# Patient Record
Sex: MALE | Race: BLACK OR AFRICAN AMERICAN | Employment: UNEMPLOYED | URBAN - METROPOLITAN AREA
[De-identification: names, ages, dates, MRNs, and addresses within clinical notes are randomized per-mention and may not be internally consistent; named-entity substitution may affect disease eponyms.]

---

## 2017-01-01 ENCOUNTER — APPOINTMENT (OUTPATIENT)
Dept: GENERAL RADIOLOGY | Age: 52
DRG: 871 | End: 2017-01-01
Attending: EMERGENCY MEDICINE
Payer: SELF-PAY

## 2017-01-01 ENCOUNTER — APPOINTMENT (OUTPATIENT)
Dept: CT IMAGING | Age: 52
DRG: 871 | End: 2017-01-01
Attending: EMERGENCY MEDICINE
Payer: SELF-PAY

## 2017-01-01 ENCOUNTER — APPOINTMENT (OUTPATIENT)
Dept: CT IMAGING | Age: 52
DRG: 871 | End: 2017-01-01
Attending: INTERNAL MEDICINE
Payer: SELF-PAY

## 2017-01-01 ENCOUNTER — APPOINTMENT (OUTPATIENT)
Dept: ULTRASOUND IMAGING | Age: 52
DRG: 871 | End: 2017-01-01
Attending: EMERGENCY MEDICINE
Payer: SELF-PAY

## 2017-01-01 ENCOUNTER — HOSPITAL ENCOUNTER (EMERGENCY)
Age: 52
Discharge: HOME | End: 2017-05-16
Attending: EMERGENCY MEDICINE
Payer: SELF-PAY

## 2017-01-01 ENCOUNTER — HOSPITAL ENCOUNTER (INPATIENT)
Age: 52
LOS: 1 days | DRG: 871 | End: 2017-12-01
Attending: EMERGENCY MEDICINE | Admitting: INTERNAL MEDICINE
Payer: SELF-PAY

## 2017-01-01 ENCOUNTER — APPOINTMENT (OUTPATIENT)
Dept: GENERAL RADIOLOGY | Age: 52
End: 2017-01-01
Attending: EMERGENCY MEDICINE
Payer: SELF-PAY

## 2017-01-01 VITALS
BODY MASS INDEX: 25.79 KG/M2 | HEIGHT: 70 IN | OXYGEN SATURATION: 98 % | RESPIRATION RATE: 25 BRPM | WEIGHT: 180.12 LBS | TEMPERATURE: 98.2 F | SYSTOLIC BLOOD PRESSURE: 181 MMHG | HEART RATE: 91 BPM | DIASTOLIC BLOOD PRESSURE: 126 MMHG

## 2017-01-01 VITALS
BODY MASS INDEX: 25.06 KG/M2 | OXYGEN SATURATION: 85 % | SYSTOLIC BLOOD PRESSURE: 71 MMHG | TEMPERATURE: 97.5 F | WEIGHT: 175.04 LBS | HEART RATE: 104 BPM | DIASTOLIC BLOOD PRESSURE: 60 MMHG | RESPIRATION RATE: 14 BRPM | HEIGHT: 70 IN

## 2017-01-01 DIAGNOSIS — I43 CARDIOMYOPATHY DUE TO HYPERTENSION, WITH HEART FAILURE (HCC): ICD-10-CM

## 2017-01-01 DIAGNOSIS — I46.9 CARDIAC ARREST (HCC): ICD-10-CM

## 2017-01-01 DIAGNOSIS — I11.0 CARDIOMYOPATHY DUE TO HYPERTENSION, WITH HEART FAILURE (HCC): ICD-10-CM

## 2017-01-01 DIAGNOSIS — I10 ESSENTIAL HYPERTENSION: ICD-10-CM

## 2017-01-01 DIAGNOSIS — J18.9 COMMUNITY ACQUIRED PNEUMONIA, UNSPECIFIED LATERALITY: ICD-10-CM

## 2017-01-01 DIAGNOSIS — J20.9 ACUTE BRONCHITIS, UNSPECIFIED ORGANISM: Primary | ICD-10-CM

## 2017-01-01 DIAGNOSIS — A41.9 SEPTIC SHOCK (HCC): Primary | ICD-10-CM

## 2017-01-01 DIAGNOSIS — R65.21 SEPTIC SHOCK (HCC): Primary | ICD-10-CM

## 2017-01-01 LAB
ALBUMIN SERPL BCP-MCNC: 3.7 G/DL (ref 3.5–5)
ALBUMIN SERPL-MCNC: 3.1 G/DL (ref 3.5–5)
ALBUMIN/GLOB SERPL: 0.8 {RATIO} (ref 1.1–2.2)
ALBUMIN/GLOB SERPL: 0.9 {RATIO} (ref 1.1–2.2)
ALP SERPL-CCNC: 131 U/L (ref 45–117)
ALP SERPL-CCNC: 78 U/L (ref 45–117)
ALT SERPL-CCNC: 27 U/L (ref 12–78)
ALT SERPL-CCNC: 347 U/L (ref 12–78)
ANION GAP BLD CALC-SCNC: 8 MMOL/L (ref 5–15)
ANION GAP SERPL CALC-SCNC: 10 MMOL/L (ref 5–15)
AST SERPL W P-5'-P-CCNC: 21 U/L (ref 15–37)
AST SERPL-CCNC: 213 U/L (ref 15–37)
ATRIAL RATE: 90 BPM
BASOPHILS # BLD AUTO: 0.1 K/UL (ref 0–0.1)
BASOPHILS # BLD: 0 % (ref 0–1)
BASOPHILS # BLD: 0 K/UL (ref 0–0.1)
BASOPHILS NFR BLD: 0 % (ref 0–1)
BILIRUB SERPL-MCNC: 1 MG/DL (ref 0.2–1)
BILIRUB SERPL-MCNC: 2.1 MG/DL (ref 0.2–1)
BNP SERPL-MCNC: 3252 PG/ML (ref 0–125)
BNP SERPL-MCNC: ABNORMAL PG/ML (ref 0–125)
BUN SERPL-MCNC: 17 MG/DL (ref 6–20)
BUN SERPL-MCNC: 22 MG/DL (ref 6–20)
BUN/CREAT SERPL: 12 (ref 12–20)
BUN/CREAT SERPL: 17 (ref 12–20)
CALCIUM SERPL-MCNC: 8.5 MG/DL (ref 8.5–10.1)
CALCIUM SERPL-MCNC: 8.7 MG/DL (ref 8.5–10.1)
CALCULATED P AXIS, ECG09: 58 DEGREES
CALCULATED R AXIS, ECG10: -7 DEGREES
CALCULATED T AXIS, ECG11: 151 DEGREES
CHLORIDE SERPL-SCNC: 104 MMOL/L (ref 97–108)
CHLORIDE SERPL-SCNC: 109 MMOL/L (ref 97–108)
CK MB CFR SERPL CALC: 2.1 % (ref 0–2.5)
CK MB SERPL-MCNC: 1.5 NG/ML (ref 5–25)
CK SERPL-CCNC: 105 U/L (ref 39–308)
CK SERPL-CCNC: 71 U/L (ref 39–308)
CO2 SERPL-SCNC: 21 MMOL/L (ref 21–32)
CO2 SERPL-SCNC: 24 MMOL/L (ref 21–32)
CREAT SERPL-MCNC: 1.28 MG/DL (ref 0.7–1.3)
CREAT SERPL-MCNC: 1.4 MG/DL (ref 0.7–1.3)
DIAGNOSIS, 93000: NORMAL
EOSINOPHIL # BLD: 0 K/UL (ref 0–0.4)
EOSINOPHIL # BLD: 0.3 K/UL (ref 0–0.4)
EOSINOPHIL NFR BLD: 0 % (ref 0–7)
EOSINOPHIL NFR BLD: 2 % (ref 0–7)
ERYTHROCYTE [DISTWIDTH] IN BLOOD BY AUTOMATED COUNT: 14.4 % (ref 11.5–14.5)
ERYTHROCYTE [DISTWIDTH] IN BLOOD BY AUTOMATED COUNT: 14.6 % (ref 11.5–14.5)
FLUAV AG NPH QL IA: NEGATIVE
FLUBV AG NOSE QL IA: NEGATIVE
GLOBULIN SER CALC-MCNC: 3.9 G/DL (ref 2–4)
GLOBULIN SER CALC-MCNC: 4 G/DL (ref 2–4)
GLUCOSE SERPL-MCNC: 107 MG/DL (ref 65–100)
GLUCOSE SERPL-MCNC: 145 MG/DL (ref 65–100)
HCT VFR BLD AUTO: 37.7 % (ref 36.6–50.3)
HCT VFR BLD AUTO: 42.5 % (ref 36.6–50.3)
HGB BLD-MCNC: 12.3 G/DL (ref 12.1–17)
HGB BLD-MCNC: 14.3 G/DL (ref 12.1–17)
LACTATE SERPL-SCNC: 2.6 MMOL/L (ref 0.4–2)
LACTATE SERPL-SCNC: 7.9 MMOL/L (ref 0.4–2)
LYMPHOCYTES # BLD AUTO: 18 % (ref 12–49)
LYMPHOCYTES # BLD: 1 K/UL (ref 0.8–3.5)
LYMPHOCYTES # BLD: 2 K/UL (ref 0.8–3.5)
LYMPHOCYTES NFR BLD: 6 % (ref 12–49)
MAGNESIUM SERPL-MCNC: 2.1 MG/DL (ref 1.6–2.4)
MAGNESIUM SERPL-MCNC: 2.4 MG/DL (ref 1.6–2.4)
MCH RBC QN AUTO: 25.7 PG (ref 26–34)
MCH RBC QN AUTO: 29 PG (ref 26–34)
MCHC RBC AUTO-ENTMCNC: 32.6 G/DL (ref 30–36.5)
MCHC RBC AUTO-ENTMCNC: 33.6 G/DL (ref 30–36.5)
MCV RBC AUTO: 78.7 FL (ref 80–99)
MCV RBC AUTO: 86.2 FL (ref 80–99)
MONOCYTES # BLD: 0.9 K/UL (ref 0–1)
MONOCYTES # BLD: 1.4 K/UL (ref 0–1)
MONOCYTES NFR BLD AUTO: 8 % (ref 5–13)
MONOCYTES NFR BLD: 8 % (ref 5–13)
NEUTS SEG # BLD: 15.8 K/UL (ref 1.8–8)
NEUTS SEG # BLD: 8.3 K/UL (ref 1.8–8)
NEUTS SEG NFR BLD AUTO: 72 % (ref 32–75)
NEUTS SEG NFR BLD: 86 % (ref 32–75)
P-R INTERVAL, ECG05: 174 MS
PLATELET # BLD AUTO: 489 K/UL (ref 150–400)
PLATELET # BLD AUTO: 569 K/UL (ref 150–400)
POTASSIUM SERPL-SCNC: 4 MMOL/L (ref 3.5–5.1)
POTASSIUM SERPL-SCNC: 4 MMOL/L (ref 3.5–5.1)
PROT SERPL-MCNC: 7.1 G/DL (ref 6.4–8.2)
PROT SERPL-MCNC: 7.6 G/DL (ref 6.4–8.2)
Q-T INTERVAL, ECG07: 460 MS
QRS DURATION, ECG06: 162 MS
QTC CALCULATION (BEZET), ECG08: 562 MS
RBC # BLD AUTO: 4.79 M/UL (ref 4.1–5.7)
RBC # BLD AUTO: 4.93 M/UL (ref 4.1–5.7)
SODIUM SERPL-SCNC: 135 MMOL/L (ref 136–145)
SODIUM SERPL-SCNC: 141 MMOL/L (ref 136–145)
TROPONIN I BLD-MCNC: <0.04 NG/ML (ref 0–0.08)
TROPONIN I SERPL-MCNC: 0.09 NG/ML
TROPONIN I SERPL-MCNC: <0.04 NG/ML
VENTRICULAR RATE, ECG03: 90 BPM
WBC # BLD AUTO: 11.5 K/UL (ref 4.1–11.1)
WBC # BLD AUTO: 18.3 K/UL (ref 4.1–11.1)

## 2017-01-01 PROCEDURE — 84484 ASSAY OF TROPONIN QUANT: CPT | Performed by: EMERGENCY MEDICINE

## 2017-01-01 PROCEDURE — 31500 INSERT EMERGENCY AIRWAY: CPT

## 2017-01-01 PROCEDURE — 85025 COMPLETE CBC W/AUTO DIFF WBC: CPT | Performed by: EMERGENCY MEDICINE

## 2017-01-01 PROCEDURE — 83880 ASSAY OF NATRIURETIC PEPTIDE: CPT | Performed by: EMERGENCY MEDICINE

## 2017-01-01 PROCEDURE — 87449 NOS EACH ORGANISM AG IA: CPT | Performed by: INTERNAL MEDICINE

## 2017-01-01 PROCEDURE — 83605 ASSAY OF LACTIC ACID: CPT | Performed by: EMERGENCY MEDICINE

## 2017-01-01 PROCEDURE — 5A1935Z RESPIRATORY VENTILATION, LESS THAN 24 CONSECUTIVE HOURS: ICD-10-PCS | Performed by: EMERGENCY MEDICINE

## 2017-01-01 PROCEDURE — 80053 COMPREHEN METABOLIC PANEL: CPT | Performed by: EMERGENCY MEDICINE

## 2017-01-01 PROCEDURE — 87804 INFLUENZA ASSAY W/OPTIC: CPT | Performed by: EMERGENCY MEDICINE

## 2017-01-01 PROCEDURE — 0BH17EZ INSERTION OF ENDOTRACHEAL AIRWAY INTO TRACHEA, VIA NATURAL OR ARTIFICIAL OPENING: ICD-10-PCS | Performed by: EMERGENCY MEDICINE

## 2017-01-01 PROCEDURE — 82803 BLOOD GASES ANY COMBINATION: CPT | Performed by: EMERGENCY MEDICINE

## 2017-01-01 PROCEDURE — 5A12012 PERFORMANCE OF CARDIAC OUTPUT, SINGLE, MANUAL: ICD-10-PCS | Performed by: EMERGENCY MEDICINE

## 2017-01-01 PROCEDURE — 74011250636 HC RX REV CODE- 250/636: Performed by: EMERGENCY MEDICINE

## 2017-01-01 PROCEDURE — 36600 WITHDRAWAL OF ARTERIAL BLOOD: CPT

## 2017-01-01 PROCEDURE — 36415 COLL VENOUS BLD VENIPUNCTURE: CPT | Performed by: EMERGENCY MEDICINE

## 2017-01-01 PROCEDURE — 82550 ASSAY OF CK (CPK): CPT | Performed by: EMERGENCY MEDICINE

## 2017-01-01 PROCEDURE — 96365 THER/PROPH/DIAG IV INF INIT: CPT

## 2017-01-01 PROCEDURE — 71020 XR CHEST PA LAT: CPT

## 2017-01-01 PROCEDURE — 74177 CT ABD & PELVIS W/CONTRAST: CPT

## 2017-01-01 PROCEDURE — 96372 THER/PROPH/DIAG INJ SC/IM: CPT

## 2017-01-01 PROCEDURE — 93005 ELECTROCARDIOGRAM TRACING: CPT

## 2017-01-01 PROCEDURE — 87899 AGENT NOS ASSAY W/OPTIC: CPT | Performed by: INTERNAL MEDICINE

## 2017-01-01 PROCEDURE — 83735 ASSAY OF MAGNESIUM: CPT | Performed by: EMERGENCY MEDICINE

## 2017-01-01 PROCEDURE — 74011000250 HC RX REV CODE- 250: Performed by: EMERGENCY MEDICINE

## 2017-01-01 PROCEDURE — 77030022643 HC PAD DEFIB AD HRTSTRT PHL -B

## 2017-01-01 PROCEDURE — 06HY33Z INSERTION OF INFUSION DEVICE INTO LOWER VEIN, PERCUTANEOUS APPROACH: ICD-10-PCS | Performed by: EMERGENCY MEDICINE

## 2017-01-01 PROCEDURE — 93306 TTE W/DOPPLER COMPLETE: CPT

## 2017-01-01 PROCEDURE — 87040 BLOOD CULTURE FOR BACTERIA: CPT | Performed by: EMERGENCY MEDICINE

## 2017-01-01 PROCEDURE — 77030008683 HC TU ET CUF COVD -A

## 2017-01-01 PROCEDURE — 99285 EMERGENCY DEPT VISIT HI MDM: CPT

## 2017-01-01 PROCEDURE — 74011000258 HC RX REV CODE- 258: Performed by: EMERGENCY MEDICINE

## 2017-01-01 PROCEDURE — 77030010896 HC CIRC VENT TRNSPT TRAN -B

## 2017-01-01 PROCEDURE — 96367 TX/PROPH/DG ADDL SEQ IV INF: CPT

## 2017-01-01 PROCEDURE — 75810000137 HC PLCMT CENT VENOUS CATH

## 2017-01-01 PROCEDURE — 65610000006 HC RM INTENSIVE CARE

## 2017-01-01 PROCEDURE — 71010 XR CHEST PORT: CPT

## 2017-01-01 PROCEDURE — 74011250637 HC RX REV CODE- 250/637: Performed by: EMERGENCY MEDICINE

## 2017-01-01 PROCEDURE — 94002 VENT MGMT INPAT INIT DAY: CPT

## 2017-01-01 PROCEDURE — C1751 CATH, INF, PER/CENT/MIDLINE: HCPCS

## 2017-01-01 PROCEDURE — 92950 HEART/LUNG RESUSCITATION CPR: CPT

## 2017-01-01 PROCEDURE — 74011636320 HC RX REV CODE- 636/320: Performed by: EMERGENCY MEDICINE

## 2017-01-01 RX ORDER — SODIUM BICARBONATE 1 MEQ/ML
SYRINGE (ML) INTRAVENOUS
Status: DISCONTINUED | OUTPATIENT
Start: 2017-01-01 | End: 2017-12-02 | Stop reason: HOSPADM

## 2017-01-01 RX ORDER — LISINOPRIL 10 MG/1
10 TABLET ORAL DAILY
Qty: 30 TAB | Refills: 1 | Status: SHIPPED | OUTPATIENT
Start: 2017-01-01 | End: 2017-01-01

## 2017-01-01 RX ORDER — ENOXAPARIN SODIUM 100 MG/ML
1 INJECTION SUBCUTANEOUS
Status: COMPLETED | OUTPATIENT
Start: 2017-01-01 | End: 2017-01-01

## 2017-01-01 RX ORDER — ROCURONIUM BROMIDE 10 MG/ML
100 INJECTION, SOLUTION INTRAVENOUS
Status: COMPLETED | OUTPATIENT
Start: 2017-01-01 | End: 2017-01-01

## 2017-01-01 RX ORDER — EPINEPHRINE 0.1 MG/ML
INJECTION INTRACARDIAC; INTRAVENOUS
Status: DISCONTINUED | OUTPATIENT
Start: 2017-01-01 | End: 2017-12-02 | Stop reason: HOSPADM

## 2017-01-01 RX ORDER — NOREPINEPHRINE BITARTRATE 1 MG/ML
INJECTION, SOLUTION INTRAVENOUS
Status: DISCONTINUED
Start: 2017-01-01 | End: 2017-12-02 | Stop reason: HOSPADM

## 2017-01-01 RX ORDER — ASPIRIN 325 MG
325 TABLET ORAL DAILY
COMMUNITY

## 2017-01-01 RX ORDER — PROPOFOL 10 MG/ML
0-50 VIAL (ML) INTRAVENOUS
Status: DISCONTINUED | OUTPATIENT
Start: 2017-01-01 | End: 2017-12-02 | Stop reason: HOSPADM

## 2017-01-01 RX ORDER — SODIUM CHLORIDE 9 MG/ML
50 INJECTION, SOLUTION INTRAVENOUS
Status: COMPLETED | OUTPATIENT
Start: 2017-01-01 | End: 2017-01-01

## 2017-01-01 RX ORDER — SODIUM CHLORIDE 0.9 % (FLUSH) 0.9 %
5-10 SYRINGE (ML) INJECTION EVERY 8 HOURS
Status: CANCELLED | OUTPATIENT
Start: 2017-01-01

## 2017-01-01 RX ORDER — AZITHROMYCIN 250 MG/1
TABLET, FILM COATED ORAL
Qty: 6 TAB | Refills: 0 | Status: SHIPPED | OUTPATIENT
Start: 2017-01-01 | End: 2017-01-01

## 2017-01-01 RX ORDER — SODIUM CHLORIDE 0.9 % (FLUSH) 0.9 %
10 SYRINGE (ML) INJECTION
Status: COMPLETED | OUTPATIENT
Start: 2017-01-01 | End: 2017-01-01

## 2017-01-01 RX ORDER — PROPOFOL 10 MG/ML
INJECTION, EMULSION INTRAVENOUS
Status: DISCONTINUED
Start: 2017-01-01 | End: 2017-12-02 | Stop reason: HOSPADM

## 2017-01-01 RX ORDER — SODIUM CHLORIDE 9 MG/ML
130 INJECTION, SOLUTION INTRAVENOUS CONTINUOUS
Status: DISCONTINUED | OUTPATIENT
Start: 2017-01-01 | End: 2017-12-02 | Stop reason: HOSPADM

## 2017-01-01 RX ORDER — SODIUM CHLORIDE 0.9 % (FLUSH) 0.9 %
5-10 SYRINGE (ML) INJECTION AS NEEDED
Status: CANCELLED | OUTPATIENT
Start: 2017-01-01

## 2017-01-01 RX ORDER — ENOXAPARIN SODIUM 100 MG/ML
40 INJECTION SUBCUTANEOUS EVERY 24 HOURS
Status: CANCELLED | OUTPATIENT
Start: 2017-01-01

## 2017-01-01 RX ORDER — HYDRALAZINE HYDROCHLORIDE 20 MG/ML
10 INJECTION INTRAMUSCULAR; INTRAVENOUS
Status: DISCONTINUED | OUTPATIENT
Start: 2017-01-01 | End: 2017-01-01 | Stop reason: ALTCHOICE

## 2017-01-01 RX ORDER — ETOMIDATE 2 MG/ML
20 INJECTION INTRAVENOUS
Status: COMPLETED | OUTPATIENT
Start: 2017-01-01 | End: 2017-01-01

## 2017-01-01 RX ORDER — SODIUM CHLORIDE 9 MG/ML
50 INJECTION, SOLUTION INTRAVENOUS
Status: DISCONTINUED | OUTPATIENT
Start: 2017-01-01 | End: 2017-12-02 | Stop reason: HOSPADM

## 2017-01-01 RX ORDER — HYDROCHLOROTHIAZIDE 25 MG/1
25 TABLET ORAL DAILY
Qty: 30 TAB | Refills: 1 | Status: SHIPPED | OUTPATIENT
Start: 2017-01-01 | End: 2017-01-01

## 2017-01-01 RX ORDER — LISINOPRIL 5 MG/1
5 TABLET ORAL DAILY
Status: DISCONTINUED | OUTPATIENT
Start: 2017-12-02 | End: 2017-12-02 | Stop reason: HOSPADM

## 2017-01-01 RX ORDER — DOPAMINE HYDROCHLORIDE 320 MG/100ML
INJECTION, SOLUTION INTRAVENOUS
Status: COMPLETED | OUTPATIENT
Start: 2017-01-01 | End: 2017-01-01

## 2017-01-01 RX ORDER — SODIUM BICARBONATE 1 MEQ/ML
50 SYRINGE (ML) INTRAVENOUS
Status: COMPLETED | OUTPATIENT
Start: 2017-01-01 | End: 2017-01-01

## 2017-01-01 RX ORDER — SODIUM CHLORIDE 0.9 % (FLUSH) 0.9 %
5-10 SYRINGE (ML) INJECTION AS NEEDED
Status: DISCONTINUED | OUTPATIENT
Start: 2017-01-01 | End: 2017-12-02 | Stop reason: HOSPADM

## 2017-01-01 RX ORDER — METOPROLOL TARTRATE 5 MG/5ML
5 INJECTION INTRAVENOUS
Status: COMPLETED | OUTPATIENT
Start: 2017-01-01 | End: 2017-01-01

## 2017-01-01 RX ORDER — EPINEPHRINE 0.1 MG/ML
INJECTION INTRACARDIAC; INTRAVENOUS
Status: COMPLETED | OUTPATIENT
Start: 2017-01-01 | End: 2017-01-01

## 2017-01-01 RX ORDER — SODIUM CHLORIDE 0.9 % (FLUSH) 0.9 %
10 SYRINGE (ML) INJECTION
Status: DISCONTINUED | OUTPATIENT
Start: 2017-01-01 | End: 2017-12-02 | Stop reason: HOSPADM

## 2017-01-01 RX ORDER — PREDNISONE 20 MG/1
40 TABLET ORAL DAILY
Qty: 10 TAB | Refills: 0 | Status: SHIPPED | OUTPATIENT
Start: 2017-01-01 | End: 2017-01-01

## 2017-01-01 RX ORDER — SODIUM BICARBONATE 1 MEQ/ML
SYRINGE (ML) INTRAVENOUS
Status: COMPLETED | OUTPATIENT
Start: 2017-01-01 | End: 2017-01-01

## 2017-01-01 RX ADMIN — ETOMIDATE 20 MG: 2 INJECTION, SOLUTION INTRAVENOUS at 19:58

## 2017-01-01 RX ADMIN — SODIUM CHLORIDE 50 ML/HR: 900 INJECTION, SOLUTION INTRAVENOUS at 19:26

## 2017-01-01 RX ADMIN — DOPAMINE HYDROCHLORIDE 5 MCG/KG/MIN: 320 INJECTION, SOLUTION INTRAVENOUS at 21:14

## 2017-01-01 RX ADMIN — AZITHROMYCIN MONOHYDRATE 500 MG: 500 INJECTION, POWDER, LYOPHILIZED, FOR SOLUTION INTRAVENOUS at 18:09

## 2017-01-01 RX ADMIN — EPINEPHRINE 1 MG: 0.1 INJECTION, SOLUTION ENDOTRACHEAL; INTRACARDIAC; INTRAVENOUS at 21:20

## 2017-01-01 RX ADMIN — ENOXAPARIN SODIUM 80 MG: 80 INJECTION SUBCUTANEOUS at 20:46

## 2017-01-01 RX ADMIN — EPINEPHRINE 1 MG: 0.1 INJECTION, SOLUTION ENDOTRACHEAL; INTRACARDIAC; INTRAVENOUS at 21:13

## 2017-01-01 RX ADMIN — Medication 10 ML: at 19:26

## 2017-01-01 RX ADMIN — EPINEPHRINE 1 MG: 0.1 INJECTION, SOLUTION ENDOTRACHEAL; INTRACARDIAC; INTRAVENOUS at 21:16

## 2017-01-01 RX ADMIN — SODIUM BICARBONATE 50 MEQ: 84 INJECTION, SOLUTION INTRAVENOUS at 20:28

## 2017-01-01 RX ADMIN — SODIUM BICARBONATE 50 MEQ: 84 INJECTION, SOLUTION INTRAVENOUS at 21:19

## 2017-01-01 RX ADMIN — NOREPINEPHRINE BITARTRATE 16 MCG/MIN: 1 INJECTION INTRAVENOUS at 20:53

## 2017-01-01 RX ADMIN — EPINEPHRINE 1 MG: 0.1 INJECTION, SOLUTION ENDOTRACHEAL; INTRACARDIAC; INTRAVENOUS at 21:23

## 2017-01-01 RX ADMIN — NOREPINEPHRINE BITARTRATE 4 MCG/MIN: 1 INJECTION INTRAVENOUS at 20:18

## 2017-01-01 RX ADMIN — IOPAMIDOL 100 ML: 755 INJECTION, SOLUTION INTRAVENOUS at 19:26

## 2017-01-01 RX ADMIN — CEFTRIAXONE SODIUM 2 G: 2 INJECTION, POWDER, FOR SOLUTION INTRAMUSCULAR; INTRAVENOUS at 16:41

## 2017-01-01 RX ADMIN — EPINEPHRINE 1 MG: 0.1 INJECTION, SOLUTION ENDOTRACHEAL; INTRACARDIAC; INTRAVENOUS at 21:06

## 2017-01-01 RX ADMIN — EPINEPHRINE 1 MG: 0.1 INJECTION, SOLUTION ENDOTRACHEAL; INTRACARDIAC; INTRAVENOUS at 21:09

## 2017-01-01 RX ADMIN — METOPROLOL TARTRATE 5 MG: 5 INJECTION INTRAVENOUS at 18:13

## 2017-01-01 RX ADMIN — SODIUM CHLORIDE 1000 ML: 900 INJECTION, SOLUTION INTRAVENOUS at 18:29

## 2017-01-01 RX ADMIN — NITROGLYCERIN 1 INCH: 20 OINTMENT TOPICAL at 16:39

## 2017-01-01 RX ADMIN — EPINEPHRINE 1 MG: 0.1 INJECTION, SOLUTION ENDOTRACHEAL; INTRACARDIAC; INTRAVENOUS at 21:27

## 2017-01-01 RX ADMIN — ROCURONIUM BROMIDE 100 MG: 10 INJECTION INTRAVENOUS at 19:59

## 2017-01-01 RX ADMIN — SODIUM BICARBONATE 50 MEQ: 84 INJECTION, SOLUTION INTRAVENOUS at 21:07

## 2017-05-16 NOTE — ED PROVIDER NOTES
HPI Comments: Senia Peterson is a 45 y.o. male with PMhx significant for HTN, asthma, and CVA who presents ambulatory to the ED as referred by [facility] regarding complaint of intermittent SOB x 2-3 weeks with an abnormal EKG at their office. Pt states symptoms are typically worse at night when laying flat. He reports use of  mg this morning. He denies any known hx of having EKG's which showed a LBBB. He specifically denies any CP or N/V/D. Social Hx: - Tobacco, + EtOH (occasionally), - Illicit Drugs    PCP: Bernardino Doyle MD    There are no other complaints, changes or physical findings at this time. The history is provided by the patient. No  was used. Past Medical History:   Diagnosis Date    Asthma     Hypertension     Stroke ([facility] [address]     in 2012       History reviewed. No pertinent surgical history. History reviewed. No pertinent family history. Social History     Social History    Marital status: SINGLE     Spouse name: N/A    Number of children: N/A    Years of education: N/A     Occupational History    Not on file. Social History Main Topics    Smoking status: Current Some Day Smoker     Packs/day: 0.25    Smokeless tobacco: Never Used    Alcohol use 2.4 oz/week     4 Cans of beer per week      Comment: occassional    Drug use: No    Sexual activity: Not on file     Other Topics Concern    Not on file     Social History Narrative         ALLERGIES: Review of patient's allergies indicates no known allergies. Review of Systems   Constitutional: Negative. Negative for appetite change, chills, fatigue and fever. HENT: Negative. Negative for congestion, rhinorrhea, sinus pressure and sore throat. Eyes: Negative. Respiratory: Positive for shortness of breath (orthopnea). Negative for cough, choking, chest tightness and wheezing. Cardiovascular: Negative. Negative for chest pain, palpitations and leg swelling. Gastrointestinal: Negative for abdominal pain, constipation, diarrhea, nausea and vomiting. Endocrine: Negative. Genitourinary: Negative. Negative for difficulty urinating, dysuria, flank pain and urgency. Musculoskeletal: Negative. Skin: Negative. Neurological: Negative. Negative for dizziness, speech difficulty, weakness, light-headedness, numbness and headaches. Psychiatric/Behavioral: Negative. All other systems reviewed and are negative. Patient Vitals for the past 12 hrs:   Temp Pulse Resp BP SpO2   05/16/17 1905 - 91 25 (!) 181/126 98 %   05/16/17 1900 - 95 23 (!) 189/122 98 %   05/16/17 1845 - 90 21 (!) 175/113 99 %   05/16/17 1830 - 90 25 (!) 177/117 98 %   05/16/17 1815 - 92 25 (!) 188/131 98 %   05/16/17 1808 - 93 23 (!) 181/111 98 %   05/16/17 1805 - 88 23 (!) 178/121 98 %   05/16/17 1750 - - - (!) 185/114 -   05/16/17 1700 - 90 22 (!) 155/109 99 %   05/16/17 1655 - - - (!) 171/113 -   05/16/17 1644 98.2 °F (36.8 °C) 88 20 (!) 174/126 97 %            Physical Exam   Constitutional: He is oriented to person, place, and time. He appears well-developed and well-nourished. No distress. HENT:   Head: Normocephalic and atraumatic. Mouth/Throat: Oropharynx is clear and moist. No oropharyngeal exudate. Eyes: Conjunctivae and EOM are normal. Pupils are equal, round, and reactive to light. Neck: Normal range of motion. Neck supple. No JVD present. No tracheal deviation present. Cardiovascular: Normal rate, regular rhythm, normal heart sounds and intact distal pulses. No murmur heard. Pulmonary/Chest: Effort normal. No stridor. No respiratory distress. He has no wheezes. He has no rales. He exhibits no tenderness. Coarse breath sounds left base   Abdominal: Soft. He exhibits no distension. There is no tenderness. There is no rebound and no guarding. Musculoskeletal: Normal range of motion. He exhibits no edema or tenderness.    Neurological: He is alert and oriented to person, place, and time. No cranial nerve deficit. No gross motor or sensory deficits    Skin: Skin is warm and dry. He is not diaphoretic. Psychiatric: He has a normal mood and affect. His behavior is normal.   Nursing note and vitals reviewed. MDM  Number of Diagnoses or Management Options  Acute bronchitis, unspecified organism:   Essential hypertension:   Diagnosis management comments: DDx: pneumonia, CHF, ACS, electrolyte abnormality       Amount and/or Complexity of Data Reviewed  Clinical lab tests: ordered and reviewed  Tests in the radiology section of CPT®: ordered and reviewed  Tests in the medicine section of CPT®: ordered and reviewed  Review and summarize past medical records: yes  Independent visualization of images, tracings, or specimens: yes    Patient Progress  Patient progress: stable    ED Course       Procedures    EKG- NSR, LBBB, rate 90, normal pr, prolonged qrs, ST-T wave changes c/w LBBB, no acute changes, Hugo Mcallister, DO    PROGRESS NOTE:  6:36 PM  Per RN, pt reports a hx of HTN but states he has not taken medication for HTN x 5 years after he ran out of the medication. Pt's BP continues to be elevated while in the ED. He was previously on Lisinopril 10 mg.      LABORATORY TESTS:  Recent Results (from the past 12 hour(s))   EKG, 12 LEAD, INITIAL    Collection Time: 05/16/17  4:49 PM   Result Value Ref Range    Ventricular Rate 90 BPM    Atrial Rate 90 BPM    P-R Interval 174 ms    QRS Duration 162 ms    Q-T Interval 460 ms    QTC Calculation (Bezet) 562 ms    Calculated P Axis 58 degrees    Calculated R Axis -7 degrees    Calculated T Axis 151 degrees    Diagnosis       Normal sinus rhythm  Possible Left atrial enlargement  Left bundle branch block  No previous ECGs available     CBC WITH AUTOMATED DIFF    Collection Time: 05/16/17  5:01 PM   Result Value Ref Range    WBC 11.5 (H) 4.1 - 11.1 K/uL    RBC 4.93 4.10 - 5.70 M/uL    HGB 14.3 12.1 - 17.0 g/dL    HCT 42.5 36.6 - 50.3 %    MCV 86.2 80.0 - 99.0 FL    MCH 29.0 26.0 - 34.0 PG    MCHC 33.6 30.0 - 36.5 g/dL    RDW 14.4 11.5 - 14.5 %    PLATELET 190 (H) 645 - 400 K/uL    NEUTROPHILS 72 32 - 75 %    LYMPHOCYTES 18 12 - 49 %    MONOCYTES 8 5 - 13 %    EOSINOPHILS 2 0 - 7 %    BASOPHILS 0 0 - 1 %    ABS. NEUTROPHILS 8.3 (H) 1.8 - 8.0 K/UL    ABS. LYMPHOCYTES 2.0 0.8 - 3.5 K/UL    ABS. MONOCYTES 0.9 0.0 - 1.0 K/UL    ABS. EOSINOPHILS 0.3 0.0 - 0.4 K/UL    ABS. BASOPHILS 0.1 0.0 - 0.1 K/UL   METABOLIC PANEL, COMPREHENSIVE    Collection Time: 05/16/17  5:01 PM   Result Value Ref Range    Sodium 141 136 - 145 mmol/L    Potassium 4.0 3.5 - 5.1 mmol/L    Chloride 109 (H) 97 - 108 mmol/L    CO2 24 21 - 32 mmol/L    Anion gap 8 5 - 15 mmol/L    Glucose 107 (H) 65 - 100 mg/dL    BUN 17 6 - 20 MG/DL    Creatinine 1.40 (H) 0.70 - 1.30 MG/DL    BUN/Creatinine ratio 12 12 - 20      GFR est AA >60 >60 ml/min/1.73m2    GFR est non-AA 53 (L) >60 ml/min/1.73m2    Calcium 8.7 8.5 - 10.1 MG/DL    Bilirubin, total 1.0 0.2 - 1.0 MG/DL    ALT (SGPT) 27 12 - 78 U/L    AST (SGOT) 21 15 - 37 U/L    Alk.  phosphatase 78 45 - 117 U/L    Protein, total 7.6 6.4 - 8.2 g/dL    Albumin 3.7 3.5 - 5.0 g/dL    Globulin 3.9 2.0 - 4.0 g/dL    A-G Ratio 0.9 (L) 1.1 - 2.2     CK W/ REFLX CKMB    Collection Time: 05/16/17  5:01 PM   Result Value Ref Range     39 - 308 U/L   TROPONIN I    Collection Time: 05/16/17  5:01 PM   Result Value Ref Range    Troponin-I, Qt. <0.04 <0.05 ng/mL   MAGNESIUM    Collection Time: 05/16/17  5:01 PM   Result Value Ref Range    Magnesium 2.4 1.6 - 2.4 mg/dL   PRO-BNP    Collection Time: 05/16/17  5:01 PM   Result Value Ref Range    NT pro-BNP 3252 (H) 0 - 125 PG/ML   POC TROPONIN-I    Collection Time: 05/16/17  5:07 PM   Result Value Ref Range    Troponin-I (POC) <0.04 0.00 - 0.08 ng/mL       IMAGING RESULTS:  CXR Results  (Last 48 hours)               05/16/17 1731  XR CHEST PA LAT Final result    Impression:  IMPRESSION:     Peribronchial thickening suggesting bronchitis       Narrative:  INDICATION:  Shortness of breath x2 3 weeks. COMPARISON:  No old study. FINDINGS:  PA and lateral views were obtained of the chest.     The heart is top normal in size. Peribronchial thickening is seen. Small pleural effusions are seen. The regional osseous structures are unremarkable. IMPRESSION:  1. Acute bronchitis, unspecified organism    2. Essential hypertension        PLAN:  1. Current Discharge Medication List      START taking these medications    Details   lisinopril (PRINIVIL) 10 mg tablet Take 1 Tab by mouth daily for 10 days. Qty: 30 Tab, Refills: 1      hydroCHLOROthiazide (HYDRODIURIL) 25 mg tablet Take 1 Tab by mouth daily for 10 days. Qty: 30 Tab, Refills: 1      predniSONE (DELTASONE) 20 mg tablet Take 2 Tabs by mouth daily for 5 days. With Breakfast  Qty: 10 Tab, Refills: 0      azithromycin (ZITHROMAX) 250 mg tablet Take 2 tabs on day 1, then 1 tab a day for 4 days  Qty: 6 Tab, Refills: 0           2. Follow-up Information     Follow up With Details 82 Clemencia Montana MD   [address] [place] [address]  [phone]          3. In the emergency department today you had an elevated blood pressure. Please follow-up with your primary care physician to have your blood pressure rechecked. Return to ED if worse     Discharge Note:  7:04 PM  The patient has been re-evaluated and is ready for discharge. Reviewed available results with patient. Counseled patient on diagnosis and care plan. Patient has expressed understanding, and all questions have been answered. Patient agrees with plan and agrees to follow up as recommended, or return to the ED if their symptoms worsen. Discharge instructions have been provided and explained to the patient, along with reasons to return to the ED. Attestation: This note is prepared by Daniel Guerrero. Vero, acting as Scribe for Josephine Andrews, [facility]: The scribe's documentation has been prepared under my direction and personally reviewed by me in its entirety. I confirm that the note above accurately reflects all work, treatment, procedures, and medical decision making performed by me.

## 2017-05-16 NOTE — ED TRIAGE NOTES
Patient arrives ambulatory to ED with significant other with SOB intermittent for 2-3 weeks, worse for 1-2 days and at night. Seen at [facility]; EKG shows A Flutter with LBBB. No acute distress at present.

## 2017-05-16 NOTE — DISCHARGE INSTRUCTIONS
Bronchitis: Care Instructions  Your Care Instructions    Bronchitis is inflammation of the bronchial tubes, which carry air to the lungs. The tubes swell and produce mucus, or phlegm. The mucus and inflamed bronchial tubes make you cough. You may have trouble breathing. Most cases of bronchitis are caused by viruses like those that cause colds. Antibiotics usually do not help and they may be harmful. Bronchitis usually develops rapidly and lasts about 2 to 3 weeks in otherwise healthy people. Follow-up care is a key part of your treatment and safety. Be sure to make and go to all appointments, and call your doctor if you are having problems. It's also a good idea to know your test results and keep a list of the medicines you take. How can you care for yourself at home? · Take all medicines exactly as prescribed. Call your doctor if you think you are having a problem with your medicine. · Get some extra rest.  · Take an over-the-counter pain medicine, such as acetaminophen (Tylenol), ibuprofen (Advil, Motrin), or naproxen (Aleve) to reduce fever and relieve body aches. Read and follow all instructions on the label. · Do not take two or more pain medicines at the same time unless the doctor told you to. Many pain medicines have acetaminophen, which is Tylenol. Too much acetaminophen (Tylenol) can be harmful. · Take an over-the-counter cough medicine that contains dextromethorphan to help quiet a dry, hacking cough so that you can sleep. Avoid cough medicines that have more than one active ingredient. Read and follow all instructions on the label. · Breathe moist air from a humidifier, hot shower, or sink filled with hot water. The heat and moisture will thin mucus so you can cough it out. · Do not smoke. Smoking can make bronchitis worse. If you need help quitting, talk to your doctor about stop-smoking programs and medicines. These can increase your chances of quitting for good.   When should you call for help? Call 911 anytime you think you may need emergency care. For example, call if:  · You have severe trouble breathing. Call your doctor now or seek immediate medical care if:  · You have new or worse trouble breathing. · You cough up dark brown or bloody mucus (sputum). · You have a new or higher fever. · You have a new rash. Watch closely for changes in your health, and be sure to contact your doctor if:  · You cough more deeply or more often, especially if you notice more mucus or a change in the color of your mucus. · You are not getting better as expected. Where can you learn more? Go to http://dex-jessica.info/. Enter H333 in the search box to learn more about \"Bronchitis: Care Instructions. \"  Current as of: May 23, 2016  Content Version: 11.2  © 4379-1038 [employer]. Care instructions adapted under license by [employer] (which disclaims liability or warranty for this information). If you have questions about a medical condition or this instruction, always ask your healthcare professional. [place] [address] any warranty or liability for your use of this information. High Blood Pressure: Care Instructions  Your Care Instructions  If your blood pressure is usually above 140/90, you have high blood pressure, or hypertension. That means the top number is 140 or higher or the bottom number is 90 or higher, or both. Despite what a lot of people think, high blood pressure usually doesn't cause headaches or make you feel dizzy or lightheaded. It usually has no symptoms. But it does increase your risk for heart attack, stroke, and kidney or eye damage. The higher your blood pressure, the more your risk increases. Your doctor will give you a goal for your blood pressure. Your goal will be based on your health and your age. An example of a goal is to keep your blood pressure below 140/90.   Lifestyle changes, such as eating healthy and being active, are always important to help lower blood pressure. You might also take medicine to reach your blood pressure goal.  Follow-up care is a key part of your treatment and safety. Be sure to make and go to all appointments, and call your doctor if you are having problems. It's also a good idea to know your test results and keep a list of the medicines you take. How can you care for yourself at home? Medical treatment  · If you stop taking your medicine, your blood pressure will go back up. You may take one or more types of medicine to lower your blood pressure. Be safe with medicines. Take your medicine exactly as prescribed. Call your doctor if you think you are having a problem with your medicine. · Talk to your doctor before you start taking aspirin every day. Aspirin can help certain people lower their risk of a heart attack or stroke. But taking aspirin isn't right for everyone, because it can cause serious bleeding. · See your doctor regularly. You may need to see the doctor more often at first or until your blood pressure comes down. · If you are taking blood pressure medicine, talk to your doctor before you take decongestants or anti-inflammatory medicine, such as ibuprofen. Some of these medicines can raise blood pressure. · Learn how to check your blood pressure at home. Lifestyle changes  · Stay at a healthy weight. This is especially important if you put on weight around the waist. Losing even 10 pounds can help you lower your blood pressure. · If your doctor recommends it, get more exercise. Walking is a good choice. Bit by bit, increase the amount you walk every day. Try for at least 30 minutes on most days of the week. You also may want to swim, bike, or do other activities. · Avoid or limit alcohol. Talk to your doctor about whether you can drink any alcohol. · Try to limit how much sodium you eat to less than 2,300 milligrams (mg) a day.  Your doctor may ask you to try to eat less than 1,500 mg a day. · Eat plenty of fruits (such as bananas and oranges), vegetables, legumes, whole grains, and low-fat dairy products. · Lower the amount of saturated fat in your diet. Saturated fat is found in animal products such as milk, cheese, and meat. Limiting these foods may help you lose weight and also lower your risk for heart disease. · Do not smoke. Smoking increases your risk for heart attack and stroke. If you need help quitting, talk to your doctor about stop-smoking programs and medicines. These can increase your chances of quitting for good. When should you call for help? Call 911 anytime you think you may need emergency care. This may mean having symptoms that suggest that your blood pressure is causing a serious heart or blood vessel problem. Your blood pressure may be over 180/110. For example, call 911 if:  · You have symptoms of a heart attack. These may include:  ¨ Chest pain or pressure, or a strange feeling in the chest.  ¨ Sweating. ¨ Shortness of breath. ¨ Nausea or vomiting. ¨ Pain, pressure, or a strange feeling in the back, neck, jaw, or upper belly or in one or both shoulders or arms. ¨ Lightheadedness or sudden weakness. ¨ A fast or irregular heartbeat. · You have symptoms of a stroke. These may include:  ¨ Sudden numbness, tingling, weakness, or loss of movement in your face, arm, or leg, especially on only one side of your body. ¨ Sudden vision changes. ¨ Sudden trouble speaking. ¨ Sudden confusion or trouble understanding simple statements. ¨ Sudden problems with walking or balance. ¨ A sudden, severe headache that is different from past headaches. · You have severe back or belly pain. Do not wait until your blood pressure comes down on its own. Get help right away. Call your doctor now or seek immediate care if:  · Your blood pressure is much higher than normal (such as 180/110 or higher), but you don't have symptoms.   · You think high blood pressure is causing symptoms, such as:  ¨ Severe headache. ¨ Blurry vision. Watch closely for changes in your health, and be sure to contact your doctor if:  · Your blood pressure measures 140/90 or higher at least 2 times. That means the top number is 140 or higher or the bottom number is 90 or higher, or both. · You think you may be having side effects from your blood pressure medicine. · Your blood pressure is usually normal, but it goes above normal at least 2 times. Where can you learn more? Go to http://dex-jessica.info/. Enter D107 in the search box to learn more about \"High Blood Pressure: Care Instructions. \"  Current as of: August 8, 2016  Content Version: 11.2  © 5357-2358 [employer]. Care instructions adapted under license by [employer] (which disclaims liability or warranty for this information). If you have questions about a medical condition or this instruction, always ask your healthcare professional. [place] [address] any warranty or liability for your use of this information.

## 2017-05-16 NOTE — ED NOTES
Dr. Cheyanne Conte at bedside to provide discharge paperwork. Vital signs stable. Pt in no apparent distress at this time. Mental status at baseline. Ambulatory to waiting room with steady gate, discharge paperwork in hand. Accompanied by visitors.

## 2017-12-01 PROBLEM — J18.9 PNEUMONIA: Status: ACTIVE | Noted: 2017-01-01

## 2017-12-01 PROBLEM — A41.9 SEPSIS (HCC): Status: ACTIVE | Noted: 2017-01-01

## 2017-12-01 PROBLEM — R17 ELEVATED BILIRUBIN: Status: ACTIVE | Noted: 2017-01-01

## 2017-12-01 NOTE — H&P
Hospitalist Admission Note    NAME: Kayla Villaseñor   :  1965   MRN:  [id number]     Date/Time:  2017 6:05 PM    Patient PCP: None  ________________________________________________________________________    My assessment of this patient's clinical condition and my plan of care is as follows. Assessment / Plan:    1) Pneumonia: continue ABX, nebs, O2. Influenza NEG, will order legionella and strep neum ag    2) Sepsis: Secondary to pneumonia: Continue sepsis protocol    3) Elevated Birr: Will do abdomen CT, Am labs    4) Hx stroke: Continue ASA    5) HTN: Not well controlled, will add lisinopril      Code Status: full  Surrogate Decision Maker:    DVT Prophylaxis: lovenox  GI Prophylaxis: not indicated    Baseline: lives at home,         Subjective:   CHIEF COMPLAINT: SOB, fever    HISTORY OF PRESENT ILLNESS:     Brad Tran is a 47 y.o. PMH stroke, HTN who presents to the ED because SOB, fever, cough. He said this symptoms started at list 2 weeks ago. He thought it was a regular cold so did not see a MD. The symptoms got worse over the past 2 days reason why he now decided to come to the ED for further eval and treatment. He denies travel hx or sick contacts. We were asked to admit for work up and evaluation of the above problems. Past Medical History:   Diagnosis Date    Asthma     Hypertension     Stroke ([facility] [address]     in         No past surgical history on file. Social History   Substance Use Topics    Smoking status: Current Some Day Smoker     Packs/day: 0.25    Smokeless tobacco: Never Used    Alcohol use 2.4 oz/week     4 Cans of beer per week      Comment: occassional        No family history on file. No Known Allergies     Prior to Admission medications    Medication Sig Start Date End Date Taking? Authorizing Provider   aspirin (ASPIRIN) 325 mg tablet Take 325 mg by mouth daily.    Yes Phys Other, MD       REVIEW OF SYSTEMS:     I am not able to complete the review of systems because: The patient is intubated and sedated    The patient has altered mental status due to his acute medical problems    The patient has baseline aphasia from prior stroke(s)    The patient has baseline dementia and is not reliable historian    The patient is in acute medical distress and unable to provide information           Total of 12 systems reviewed as follows:       POSITIVE= underlined text  Negative = text not underlined  General:  fever, chills, sweats, generalized weakness, weight loss/gain,      loss of appetite   Eyes:    blurred vision, eye pain, loss of vision, double vision  ENT:    rhinorrhea, pharyngitis   Respiratory:   cough, sputum production, SOB, PERALTA, wheezing, pleuritic pain   Cardiology:   chest pain, palpitations, orthopnea, PND, edema, syncope   Gastrointestinal:  abdominal pain , N/V, diarrhea, dysphagia, constipation, bleeding   Genitourinary:  frequency, urgency, dysuria, hematuria, incontinence   Muskuloskeletal :  arthralgia, myalgia, back pain  Hematology:  easy bruising, nose or gum bleeding, lymphadenopathy   Dermatological: rash, ulceration, pruritis, color change / jaundice  Endocrine:   hot flashes or polydipsia   Neurological:  headache, dizziness, confusion, focal weakness, paresthesia,     Speech difficulties, memory loss, gait difficulty  Psychological: Feelings of anxiety, depression, agitation    Objective:   VITALS:    Visit Vitals    BP (!) 165/110    Pulse (!) 109    Temp 97.5 °F (36.4 °C)    Resp (!) 40    Ht 5' 10\" (1.778 m)    Wt 79.4 kg (175 lb 0.7 oz)    SpO2 95%    BMI 25.12 kg/m2       PHYSICAL EXAM:    General:    Alert, cooperative, no distress, appears stated age. HEENT: Atraumatic, anicteric sclerae, pink conjunctivae     No oral ulcers, mucosa moist, throat clear, dentition fair  Neck:  Supple, symmetrical,  thyroid: non tender  Lungs:   Decreased breath sounds, wheezes.   Chest wall:  No tenderness  No Accessory muscle use.  Heart:   Regular  rhythm,  No  murmur   No edema  Abdomen:   Soft, non-tender. Not distended. Bowel sounds normal  Extremities: No cyanosis. No clubbing,      Skin turgor normal, Capillary refill normal, Radial dial pulse 2+  Skin:     Not pale. Not Jaundiced  No rashes   Psych:  Good insight. Not depressed. Not anxious or agitated. Neurologic: EOMs intact. No facial asymmetry. No aphasia or slurred speech. Symmetrical strength, Sensation grossly intact. Alert and oriented X 4. CXR  IMPRESSION: There is airspace disease at the right lung base and a small focus  in the left midlung may represent pneumonia. There is mild cardiomegaly. There  is a right 10th rib fracture. ____________________________________________________________  Care Plan discussed with:    Comments   Patient x    Family      RN x    Care Manager                    Consultant:      _______________________________________________________________________  Expected  Disposition:   Home with Family x   HH/PT/OT/RN    SNF/LTC    BERE    ________________________________________________________________________  TOTAL TIME:  48 Minutes    Critical Care Provided     Minutes non procedure based      Comments     Reviewed previous records   >50% of visit spent in counseling and coordination of care  Discussion with patient and/or family and questions answered       ________________________________________________________________________  Signed: Michael Dutton MD    Procedures: see electronic medical records for all procedures/Xrays and details which were not copied into this note but were reviewed prior to creation of Plan.     LAB DATA REVIEWED:    Recent Results (from the past 24 hour(s))   EKG, 12 LEAD, INITIAL    Collection Time: 12/01/17  1:57 PM   Result Value Ref Range    Ventricular Rate 99 BPM    Atrial Rate 99 BPM    P-R Interval 152 ms    QRS Duration 172 ms    Q-T Interval 450 ms    QTC Calculation (Bezet) 577 ms Calculated P Axis 67 degrees    Calculated R Axis -23 degrees    Calculated T Axis 146 degrees    Diagnosis       Normal sinus rhythm  Biatrial enlargement  Left bundle branch block  When compared with ECG of 16-MAY-2017 16:49,  No significant change was found     CBC WITH AUTOMATED DIFF    Collection Time: 12/01/17  2:26 PM   Result Value Ref Range    WBC 18.3 (H) 4.1 - 11.1 K/uL    RBC 4.79 4.10 - 5.70 M/uL    HGB 12.3 12.1 - 17.0 g/dL    HCT 37.7 36.6 - 50.3 %    MCV 78.7 (L) 80.0 - 99.0 FL    MCH 25.7 (L) 26.0 - 34.0 PG    MCHC 32.6 30.0 - 36.5 g/dL    RDW 14.6 (H) 11.5 - 14.5 %    PLATELET 550 (H) 848 - 400 K/uL    NEUTROPHILS 86 (H) 32 - 75 %    LYMPHOCYTES 6 (L) 12 - 49 %    MONOCYTES 8 5 - 13 %    EOSINOPHILS 0 0 - 7 %    BASOPHILS 0 0 - 1 %    ABS. NEUTROPHILS 15.8 (H) 1.8 - 8.0 K/UL    ABS. LYMPHOCYTES 1.0 0.8 - 3.5 K/UL    ABS. MONOCYTES 1.4 (H) 0.0 - 1.0 K/UL    ABS. EOSINOPHILS 0.0 0.0 - 0.4 K/UL    ABS. BASOPHILS 0.0 0.0 - 0.1 K/UL   METABOLIC PANEL, COMPREHENSIVE    Collection Time: 12/01/17  2:26 PM   Result Value Ref Range    Sodium 135 (L) 136 - 145 mmol/L    Potassium 4.0 3.5 - 5.1 mmol/L    Chloride 104 97 - 108 mmol/L    CO2 21 21 - 32 mmol/L    Anion gap 10 5 - 15 mmol/L    Glucose 145 (H) 65 - 100 mg/dL    BUN 22 (H) 6 - 20 MG/DL    Creatinine 1.28 0.70 - 1.30 MG/DL    BUN/Creatinine ratio 17 12 - 20      GFR est AA >60 >60 ml/min/1.73m2    GFR est non-AA 59 (L) >60 ml/min/1.73m2    Calcium 8.5 8.5 - 10.1 MG/DL    Bilirubin, total 2.1 (H) 0.2 - 1.0 MG/DL    ALT (SGPT) 347 (H) 12 - 78 U/L    AST (SGOT) 213 (H) 15 - 37 U/L    Alk.  phosphatase 131 (H) 45 - 117 U/L    Protein, total 7.1 6.4 - 8.2 g/dL    Albumin 3.1 (L) 3.5 - 5.0 g/dL    Globulin 4.0 2.0 - 4.0 g/dL    A-G Ratio 0.8 (L) 1.1 - 2.2     CK W/ CKMB & INDEX    Collection Time: 12/01/17  2:26 PM   Result Value Ref Range    CK 71 39 - 308 U/L    CK - MB 1.5 <3.6 NG/ML    CK-MB Index 2.1 0 - 2.5     MAGNESIUM    Collection Time: 12/01/17 2:26 PM   Result Value Ref Range    Magnesium 2.1 1.6 - 2.4 mg/dL   NT-PRO BNP    Collection Time: 12/01/17  2:26 PM   Result Value Ref Range    NT pro-BNP 95845 (H) 0 - 125 PG/ML   TROPONIN I    Collection Time: 12/01/17  2:26 PM   Result Value Ref Range    Troponin-I, Qt. 0.09 (H) <0.05 ng/mL   INFLUENZA A & B AG (RAPID TEST)    Collection Time: 12/01/17  3:14 PM   Result Value Ref Range    Influenza A Antigen NEGATIVE  NEG      Influenza B Antigen NEGATIVE  NEG     LACTIC ACID    Collection Time: 12/01/17  4:03 PM   Result Value Ref Range    Lactic acid 2.6 (HH) 0.4 - 2.0 MMOL/L   EKG, 12 LEAD, INITIAL    Collection Time: 12/01/17  4:43 PM   Result Value Ref Range    Ventricular Rate 108 BPM    Atrial Rate 108 BPM    P-R Interval 148 ms    QRS Duration 160 ms    Q-T Interval 410 ms    QTC Calculation (Bezet) 549 ms    Calculated P Axis 68 degrees    Calculated R Axis -26 degrees    Calculated T Axis 136 degrees    Diagnosis       Sinus tachycardia  Biatrial enlargement  Left bundle branch block  Abnormal ECG  When compared with ECG of 01-DEC-2017 13:57,  MANUAL COMPARISON REQUIRED, DATA IS UNCONFIRMED

## 2017-12-01 NOTE — PROGRESS NOTES
Pharmacy Clarification of the Prior to Admission Medication Regimen Retrospective to the Admission Medication Reconciliation    The patient was interviewed regarding clarification of the prior to admission medication regimen and questioned regarding use of any other inhalers, topical products, over the counter medications, herbal medications, vitamin products or ophthalmic/nasal/otic medication use. Information Obtained From: Patient    Recommendations/Findings: The following amendments were made to the patient's active medication list on file at [facility]:     1) Additions: None    2) Removals: None    3) Changes: None    4) Pertinent Pharmacy Findings: None     PTA medication list was corrected to the following:     Prior to Admission Medications   Prescriptions Last Dose Informant Patient Reported? Taking?   aspirin (ASPIRIN) 325 mg tablet 12/1/2017 at Unknown time Self Yes Yes   Sig: Take 325 mg by mouth daily.       Facility-Administered Medications: None          Thank you,  Anat Neville CPhT  Medication History Pharmacy Technician

## 2017-12-01 NOTE — ED PROVIDER NOTES
EMERGENCY DEPARTMENT HISTORY AND PHYSICAL EXAM      Date: 12/1/2017  Patient Name: Bri Fraire    History of Presenting Illness     Chief Complaint   Patient presents with    Shortness of Breath     worsening over last couple weeks, +PERALTA, +cough    Fatigue     worsening over last several weeks       History Provided By: Patient    HPI: Bri Fraire, 47 y.o. male with PMHx significant for HTN, asthma, and CVA (2012), presents ambulatory to the ED [facility] ER ED with cc of intermittent SOB worsening over the past 2 weeks. Pt states the SOB is exacerbated by lying down and is worse at night, causing him to wake up in the middle of the night. He reports associated nonradiating right-sided CP lasting ~3 seconds ~3 times per night over the last two weeks but denies current chest pain. He also reports associated dry cough at night. Per reports mild BL leg swelling. Pt states he sometimes uses ASA, but does not regularly take any other medications. He denies hx of CHF, DM, PE/DVT, as well as recent surgery or travel. He specifically denies fever, chills, and hemoptysis. Social Hx: - Tobacco, + EtOH (occ), - Illicit Drugs    PCP: None    There are no other complaints, changes, or physical findings at this time.     Current Facility-Administered Medications   Medication Dose Route Frequency Provider Last Rate Last Dose    sodium chloride (NS) flush 5-10 mL  5-10 mL IntraVENous PRN Luis Sheriff MD        nitroglycerin (NITROBID) 2 % ointment 1 Inch  1 Inch Topical Q6H PRN Danyel Narvaez MD        0.9% sodium chloride infusion  130 mL/hr IntraVENous CONTINUOUS Luis Sheriff MD       [address] [place], [place] [address]yn Felty ON 12/2/2017] cefTRIAXone (ROCEPHIN) 1 g in 0.9% sodium chloride (MBP/ADV) 50 mL  1 g IntraVENous Q24H Barbi Flood MD       [address] [place] [address] Felty ON 12/2/2017] azithromycin (ZITHROMAX) 500 mg in 0.9% sodium chloride (MBP/ADV) 250 mL  500 mg IntraVENous Q24H Barbi Flood MD        [START ON 12/2/2017] lisinopril (PRINIVIL, ZESTRIL) tablet 5 mg  5 mg Oral DAILY Martin Lebron MD        NOREPINephrine (LEVOPHED) 1 mg/mL injection             propofol (DIPRIVAN) infusion  0-50 mcg/kg/min IntraVENous TITRATE Daisha Chaudhary MD        propofol (DIPRIVAN) 10 mg/mL injection             NOREPINephrine (LEVOPHED) 8 mg in dextrose 5% 250 mL infusion  2-16 mcg/min IntraVENous TITRATE Daisha Chaudhary MD 30 mL/hr at 12/01/17 2053 16 mcg/min at 12/01/17 2053    0.9% sodium chloride infusion  50 mL/hr IntraVENous RADHA Rush MD        iopamidol (ISOVUE-370) 76 % injection 100 mL  100 mL IntraVENous RADHA Rush MD        sodium chloride (NS) flush 10 mL  10 mL IntraVENous RADHA Rush MD        EPINEPHrine (ADRENALIN) 0.1 mg/mL syringe    CODE ROHINI Chaudhary MD   1 mg at 12/01/17 2127    sodium bicarbonate 8.4 % (1 mEq/mL) injection   IntraVENous CODE ROHINI Chaudhary MD   50 mEq at 12/01/17 2119     Current Outpatient Prescriptions   Medication Sig Dispense Refill    aspirin (ASPIRIN) 325 mg tablet Take 325 mg by mouth daily. Past History     Past Medical History:  Past Medical History:   Diagnosis Date    Asthma     Hypertension     Stroke ([facility] [address]     in 2012       Past Surgical History:  No past surgical history on file. Family History:  No family history on file. Social History:  Social History   Substance Use Topics    Smoking status: Current Some Day Smoker     Packs/day: 0.25    Smokeless tobacco: Never Used    Alcohol use 2.4 oz/week     4 Cans of beer per week      Comment: occassional       Allergies:  No Known Allergies      Review of Systems   Review of Systems   Constitutional: Negative for chills, fatigue and fever. HENT: Negative for congestion, rhinorrhea and sore throat. Eyes: Negative for pain, discharge and visual disturbance.    Respiratory: Positive for cough (dry, night) and shortness of breath. Negative for chest tightness and wheezing. Cardiovascular: Positive for chest pain (R sided, pm). Negative for palpitations and leg swelling. Gastrointestinal: Negative for abdominal pain, constipation, diarrhea, nausea and vomiting. Genitourinary: Negative for dysuria, frequency and hematuria. Musculoskeletal: Negative for arthralgias, back pain and myalgias. Skin: Negative for rash. Neurological: Negative for dizziness, weakness, light-headedness and headaches. Psychiatric/Behavioral: Negative. Physical Exam   Physical Exam   Constitutional: He is oriented to person, place, and time. He appears well-developed and well-nourished. No distress. HENT:   Head: Normocephalic and atraumatic. Eyes: EOM are normal. Right eye exhibits no discharge. Left eye exhibits no discharge. No scleral icterus. Neck: Normal range of motion. Neck supple. No tracheal deviation present. Cardiovascular: Regular rhythm, normal heart sounds and intact distal pulses. Tachycardia present. Exam reveals no gallop and no friction rub. No murmur heard. 90s-100s   Pulmonary/Chest: Effort normal. Tachypnea noted. No respiratory distress. He has wheezes (Faint, scattered). He has rales (BL lower lung fields, R>L). Patient tachypneic but able to talk in complete sentences without difficulty   Abdominal: Soft. He exhibits no distension. There is no tenderness. Musculoskeletal: Normal range of motion. 1+ BL LE edema   Lymphadenopathy:     He has no cervical adenopathy. Neurological: He is alert and oriented to person, place, and time. Skin: Skin is warm and dry. No rash noted. Psychiatric: He has a normal mood and affect. Nursing note and vitals reviewed.         Diagnostic Study Results     Labs -     Recent Results (from the past 12 hour(s))   EKG, 12 LEAD, INITIAL    Collection Time: 12/01/17  1:57 PM   Result Value Ref Range    Ventricular Rate 99 BPM    Atrial Rate 99 BPM    P-R Interval 152 ms    QRS Duration 172 ms    Q-T Interval 450 ms    QTC Calculation (Bezet) 577 ms    Calculated P Axis 67 degrees    Calculated R Axis -23 degrees    Calculated T Axis 146 degrees    Diagnosis       Normal sinus rhythm  Biatrial enlargement  Left bundle branch block  When compared with ECG of 16-MAY-2017 16:49,  No significant change was found     CBC WITH AUTOMATED DIFF    Collection Time: 12/01/17  2:26 PM   Result Value Ref Range    WBC 18.3 (H) 4.1 - 11.1 K/uL    RBC 4.79 4.10 - 5.70 M/uL    HGB 12.3 12.1 - 17.0 g/dL    HCT 37.7 36.6 - 50.3 %    MCV 78.7 (L) 80.0 - 99.0 FL    MCH 25.7 (L) 26.0 - 34.0 PG    MCHC 32.6 30.0 - 36.5 g/dL    RDW 14.6 (H) 11.5 - 14.5 %    PLATELET 893 (H) 861 - 400 K/uL    NEUTROPHILS 86 (H) 32 - 75 %    LYMPHOCYTES 6 (L) 12 - 49 %    MONOCYTES 8 5 - 13 %    EOSINOPHILS 0 0 - 7 %    BASOPHILS 0 0 - 1 %    ABS. NEUTROPHILS 15.8 (H) 1.8 - 8.0 K/UL    ABS. LYMPHOCYTES 1.0 0.8 - 3.5 K/UL    ABS. MONOCYTES 1.4 (H) 0.0 - 1.0 K/UL    ABS. EOSINOPHILS 0.0 0.0 - 0.4 K/UL    ABS. BASOPHILS 0.0 0.0 - 0.1 K/UL   METABOLIC PANEL, COMPREHENSIVE    Collection Time: 12/01/17  2:26 PM   Result Value Ref Range    Sodium 135 (L) 136 - 145 mmol/L    Potassium 4.0 3.5 - 5.1 mmol/L    Chloride 104 97 - 108 mmol/L    CO2 21 21 - 32 mmol/L    Anion gap 10 5 - 15 mmol/L    Glucose 145 (H) 65 - 100 mg/dL    BUN 22 (H) 6 - 20 MG/DL    Creatinine 1.28 0.70 - 1.30 MG/DL    BUN/Creatinine ratio 17 12 - 20      GFR est AA >60 >60 ml/min/1.73m2    GFR est non-AA 59 (L) >60 ml/min/1.73m2    Calcium 8.5 8.5 - 10.1 MG/DL    Bilirubin, total 2.1 (H) 0.2 - 1.0 MG/DL    ALT (SGPT) 347 (H) 12 - 78 U/L    AST (SGOT) 213 (H) 15 - 37 U/L    Alk.  phosphatase 131 (H) 45 - 117 U/L    Protein, total 7.1 6.4 - 8.2 g/dL    Albumin 3.1 (L) 3.5 - 5.0 g/dL    Globulin 4.0 2.0 - 4.0 g/dL    A-G Ratio 0.8 (L) 1.1 - 2.2     CK W/ CKMB & INDEX    Collection Time: 12/01/17  2:26 PM   Result Value Ref Range    CK 71 39 - 308 U/L    CK - MB 1.5 <3.6 NG/ML    CK-MB Index 2.1 0 - 2.5     MAGNESIUM    Collection Time: 12/01/17  2:26 PM   Result Value Ref Range    Magnesium 2.1 1.6 - 2.4 mg/dL   NT-PRO BNP    Collection Time: 12/01/17  2:26 PM   Result Value Ref Range    NT pro-BNP 22131 (H) 0 - 125 PG/ML   TROPONIN I    Collection Time: 12/01/17  2:26 PM   Result Value Ref Range    Troponin-I, Qt. 0.09 (H) <0.05 ng/mL   INFLUENZA A & B AG (RAPID TEST)    Collection Time: 12/01/17  3:14 PM   Result Value Ref Range    Influenza A Antigen NEGATIVE  NEG      Influenza B Antigen NEGATIVE  NEG     LACTIC ACID    Collection Time: 12/01/17  4:03 PM   Result Value Ref Range    Lactic acid 2.6 (HH) 0.4 - 2.0 MMOL/L   EKG, 12 LEAD, INITIAL    Collection Time: 12/01/17  4:43 PM   Result Value Ref Range    Ventricular Rate 108 BPM    Atrial Rate 108 BPM    P-R Interval 148 ms    QRS Duration 160 ms    Q-T Interval 410 ms    QTC Calculation (Bezet) 549 ms    Calculated P Axis 68 degrees    Calculated R Axis -26 degrees    Calculated T Axis 136 degrees    Diagnosis       Sinus tachycardia  Biatrial enlargement  Left bundle branch block  Abnormal ECG  When compared with ECG of 01-DEC-2017 13:57,  MANUAL COMPARISON REQUIRED, DATA IS UNCONFIRMED     LACTIC ACID    Collection Time: 12/01/17  7:54 PM   Result Value Ref Range    Lactic acid 7.9 (HH) 0.4 - 2.0 MMOL/L       Radiologic Studies -   CT Results  (Last 48 hours)               12/01/17 1925  CT ABD PELV W CONT Final result    Impression:  IMPRESSION:   1. Findings suggest elevated right heart pressure as discussed above. 2. No acute process in the abdomen or pelvis. Narrative:  INDICATION:  Elevated bilirubin. EXAM: CT Abdomen and CT Pelvis are performed with 100 mL Isovue 370 contrast IV   without complication. CT dose reduction was achieved through use of a   standardized protocol tailored for this examination and automatic exposure   control for dose modulation.        FINDINGS: There is backflow of IV contrast from the right atrium into the hepatic veins. There is minimal contrast in the abdominal aorta. There are patchy infiltrates   in the lung bases. Is there possibility of pulmonary embolism or other cause for   elevated right heart pressures? There is no abdominal inflammation, ascites, free air or significant adenopathy. Liver shows no significant finding. Bile ducts are not enlarged. Pancreas shows   no mass or inflammation. Spleen is unremarkable. Adrenal glands are normal in   size. Kidneys show no mass or hydronephrosis. Aorta shows no aneurysm. The appendix is normal. The bladder is midline and the distal ureters are not   dilated. There is no apparent pelvic mass. CXR Results  (Last 48 hours)               12/01/17 2043  XR CHEST PORT Final result    Impression:  IMPRESSION: Satisfactory intubation. Narrative:  EXAM: Portable CXR. 2027 hours. COMPARISON: 1528 hours. INTERPRETATION PROVIDED FOR COMPLIANCE ONLY AT NO CHARGE         INDICATION: placement of ETT       There is satisfactory intubation with otherwise stable appearance. 12/01/17 1531  XR CHEST PA LAT Final result    Impression:  IMPRESSION: There is airspace disease at the right lung base and a small focus   in the left midlung may represent pneumonia. There is mild cardiomegaly. There   is a right 10th rib fracture. Narrative:  EXAM:  XR CHEST PA LAT       INDICATION:   worsening dyspnea x 2 weeks       COMPARISON: 2017. FINDINGS: PA and lateral radiographs of the chest demonstrate airspace disease   at the right lung base and question of a small air airspace disease left   midlung. . There is mild cardiomegaly. .  There is a right 10th rib fracture. .                    Medical Decision Making   I am the first provider for this patient.     I reviewed the vital signs, available nursing notes, past medical history, past surgical history, family history and social history. Vital Signs-Reviewed the patient's vital signs. Patient Vitals for the past 12 hrs:   Temp Pulse Resp BP SpO2   12/01/17 2050 - - - (!) 41/31 -   12/01/17 2044 - (!) 104 14 93/71 -   12/01/17 2042 - (!) 106 14 100/83 -   12/01/17 2040 - (!) 108 14 111/89 -   12/01/17 2036 - (!) 112 14 (!) 41/32 -   12/01/17 2034 - (!) 109 16 (!) 40/28 -   12/01/17 2032 - 99 16 (!) 44/31 -   12/01/17 2030 - (!) 102 14 - -   12/01/17 2028 - (!) 110 20 (!) 61/28 -   12/01/17 2024 - (!) 114 21 (!) 38/27 -   12/01/17 2022 - (!) 115 16 (!) 39/24 -   12/01/17 2018 - (!) 115 20 (!) 42/26 -   12/01/17 2016 - (!) 118 21 (!) 51/33 -   12/01/17 2014 - (!) 129 23 (!) 56/22 (!) 85 %   12/01/17 1953 - 81 (!) 33 (!) 85/54 -   12/01/17 1946 - 80 (!) 41 (!) 82/53 -   12/01/17 1930 - 91 (!) 42 110/76 -   12/01/17 1830 - 99 (!) 38 (!) 131/103 96 %   12/01/17 1815 - (!) 103 (!) 33 (!) 130/92 97 %   12/01/17 1800 - (!) 109 (!) 41 (!) 171/117 93 %   12/01/17 1730 - (!) 112 (!) 41 (!) 180/134 91 %   12/01/17 1700 - (!) 108 (!) 44 (!) 167/123 97 %   12/01/17 1630 - (!) 109 (!) 40 (!) 165/110 95 %   12/01/17 1600 - 99 28 (!) 146/100 93 %   12/01/17 1530 - (!) 111 (!) 43 (!) 173/134 95 %   12/01/17 1500 - (!) 104 29 (!) 147/118 96 %   12/01/17 1430 - (!) 102 (!) 34 (!) 153/111 90 %   12/01/17 1348 97.5 °F (36.4 °C) (!) 104 (!) 40 (!) 165/114 100 %       Pulse Oximetry Analysis - 100% on RA    Cardiac Monitor:   Rate: 101 bpm      EKG interpretation: (Preliminary) 13:57   Rhythm: sinus tachycardia; and irregular. Rate (approx.): 99; Axis: normal; NV interval: normal; QRS interval: prolonged; QTC prolonged; LBBB; ST/T wave: changes in lateral leads but no significant changes from prior EKGs. Records Reviewed: Old Medical Records    Provider Notes (Medical Decision Making):   Patient presents to ED with worsening dyspnea x 2 weeks.   He has a long standing history of noncompliance with antihypertensive medication according to chart review. He is hypertensive in ED but denies being on any medications other than aspirin. He is maintaining oxygen saturation on room air. Suspect heart failure given history. Differential also includes pulmonary edema, pleural effusion, pneumonia, bronchitis, viral syndrome, influenza, ACS, PE (no risk factors other than tachycardia, heart failure seems more likely). - CBC, CMP, Mg, BNP, Troponin, influenza  - CXR  - Patient hypertensive, will monitor until CXR results    ED Course:   Initial assessment performed. The patients presenting problems have been discussed, and they are in agreement with the care plan formulated and outlined with them. I have encouraged them to ask questions as they arise throughout their visit. PROGRESS NOTE:  3:45  WBC is 18.3, CXR suggests air space disease in the R lung base and L mid lung. Still concerned for heart failure, so will treat with azithromycin and ceftriaxone and hold IV fluids for now. CONSULT NOTE:   3:55 PM  Jessica Yeh MD spoke with Aurelia Call MD,   Specialty: Hospitalist  Discussed pt's hx, disposition, and available diagnostic and imaging results. Reviewed care plans. Consultant will accept pt for admission. CONSULT NOTE:  Tonia Cuadra MD spoke with Dr. Tonya Zavala,  Specialty: Cardiology  Discussed patient's hx, disposition, and available diagnostic and imaging results. Reviewed care plans. Consultant will evaluate pt. PROGRESS NOTE:  5:30 PM  Workup concerning for sepsis secondary to PNA, however there is also concern for heart failure given symptoms, elevated pro BNP and equivocal troponin. Pt tachycardic in the low 100s with lactate of 2.6, so will give 1L IV fluids and titrate remaining 30cc/kg bolus at 130 ccs/hour. Pt remains hypertensive, will treat with hydralazine. PROGRESS NOTE:  5:45 PM  Spoke with Dr. Hardik Douglaser; discussed need for IV fluids given sepsis despite concern for heart failure. Recommends treating with metoprolol since patient remains hypertensive. Will treat with metoprolol instead of hydralazine. PROGRESS NOTE:  6:00 PM  Spoke with Dr. Ila Roa, hospitalist; updated on treatment plan for sepsis with IVF despite concern for heart failure. He is in agreement with management. CODE BLUE   7:45 PM  Cardiology was evaluating pt when pt became obtunded and unresponsive. I was called to bedside and had support staff start bagging pt while I prepared to intubate pt. Pt was intubated without complication. Following intubation, pt went into cardiac arrest and was treated with atropine and epinephrine. Pulse check indicated PEA. Pt was given another round of 1mg epinephrine after which pt achieved ROSC. He was started on levophed initially through a peripheral IV until central line was placed. Cardiology and Dr. Ila Roa aware of clinical condition. Stat echo pending. Procedure Note - Orotracheal Intubation:   8:00 PM  Performed by: Aniya Lopez MD   Indication for procedure: respiratory failure  RSI performed. The patient was sedated with 20 mg of etomidate and 100 mg rocuronium (Zemuron) and orotracheally intubated with a 7.5 cuffed Czech endotracheal tube using a 4-0 blade with direct visualization. ETT location confirmed by bilateral, symmetric breath sounds, good end-tidal CO2 detector color change  and chest x-ray visualization. Number of attempts: 2  Complications: none  RSI was used. The procedure took 1-15 minutes, and pt tolerated fine. Procedure Note - Central Line Placement:   8:26 PM  Performed by: Eduard Estrada MD.    Immediately prior to the procedure, the patient was reevaluated and found suitable for the planned procedure and any planned medications. Immediately prior to the procedure a time out was called to verify the correct patient, procedure, equipment, staff, and marking as appropriate.     Area was cleansed with Chlorprep and not anesthetized. Prepped and draped in sterile fashion. Landmarks identified. 18 gauge needle with triple lumen catheter was inserted into pt's Right femoral vein without ultrasound guidance. Line sutured in place; sterile dressing applied. Position: Trendelenburg  Number of attempts: 1  Estimated blood loss: 5 ccs  The procedure took 1-15 minutes, and pt tolerated well. PROGRESS NOTE:  8:20 PM  CT a/p negative for acute intraabdominal process but indicates elevated right heart pressure raising question of PE. Will obtain emergent CTA. Discussed with Dr. Benitez Harris - will treat with lovenox, agrees with emergent CTA. Stat bedside echo indicates diffuse hypokinesis with EF of 15% - suspect heart failure as cause of elevated right heart pressure but will rule out PE.      CODE BLUE  9:05  Called to CT due to pt coding. ACLS intiated. Pt treated with multiple rounds of CPR, epinephrine, bicarb. He was shocked twice for V-fib but thereafter remained in PEA. Bedside cardiac US showed no cardiac activity. Time of death called at 21:35. Please refer to nursing notes for additional code details. Dr. Benitez Harris at bedside during code. Critical Care Time:   CRITICAL CARE NOTE :  4:14 PM  IMPENDING DETERIORATION -Airway, Respiratory, Cardiovascular and Hepatic  ASSOCIATED RISK FACTORS - Hypotension, Shock and Dysrhythmia  MANAGEMENT- Bedside Assessment and Supervision of Care  INTERPRETATION -  Xrays, CT Scan, Blood Gases, ECG and Blood Pressure  INTERVENTIONS - hemodynamic mngmt and IV fluids, antibiotics, bicarb, ACLS, and pressors. CASE REVIEW - Hospitalist, Medical Sub-Specialist and Nursing  TREATMENT RESPONSE -Unchanged   PERFORMED BY - Self  NOTES   :  I have spent 90 minutes of critical care time involved in lab review, consultations with specialist, family decision- making, bedside attention and documentation.  During this entire length of time I was immediately available to the patient .      Disposition:  . Diagnosis     Clinical Impression:   1. Septic shock ([facility] [address]    2. Community acquired pneumonia, unspecified laterality    3. Cardiomyopathy due to hypertension, with heart failure ([facility] [address]    4. Cardiac arrest [facility])        Attestations: This note is prepared by Gabriel Coffman, acting as Scribe for Jesús Dunlap MD      The scribe's documentation has been prepared under my direction and personally reviewed by me in its entirety. I confirm that the note above accurately reflects all work, treatment, procedures, and medical decision making performed by me.   Jesús Dunlap MD

## 2017-12-02 LAB
ATRIAL RATE: 108 BPM
ATRIAL RATE: 99 BPM
CALCULATED P AXIS, ECG09: 67 DEGREES
CALCULATED P AXIS, ECG09: 68 DEGREES
CALCULATED R AXIS, ECG10: -23 DEGREES
CALCULATED R AXIS, ECG10: -26 DEGREES
CALCULATED T AXIS, ECG11: 136 DEGREES
CALCULATED T AXIS, ECG11: 146 DEGREES
DIAGNOSIS, 93000: NORMAL
DIAGNOSIS, 93000: NORMAL
P-R INTERVAL, ECG05: 148 MS
P-R INTERVAL, ECG05: 152 MS
Q-T INTERVAL, ECG07: 410 MS
Q-T INTERVAL, ECG07: 450 MS
QRS DURATION, ECG06: 160 MS
QRS DURATION, ECG06: 172 MS
QTC CALCULATION (BEZET), ECG08: 549 MS
QTC CALCULATION (BEZET), ECG08: 577 MS
VENTRICULAR RATE, ECG03: 108 BPM
VENTRICULAR RATE, ECG03: 99 BPM

## 2017-12-02 NOTE — CONSULTS
Cardiology Consult Note    CC: dyspnea    PCP:None  Reason for consult:  ? CHF; PNA: now shock  Requesting MD:  Dr. Roger Katz Date: 12/1/2017   Today's Date: 12/1/2017      Cardiac Assessment/Plan:   Shock: Currently being resuscitated: IVF on-going/Levophed to be started. To ICU; primary team at bedside. Rhythm: sinus/sinus tachy. LBBB: since at least 5/2017. HTN: h/o non-compliance; certainly may have HTN-CM; Mild cardiomegaly noted on CXR. Echo pending. ID/sepsis, Dispo: per primary team.     Hospital Problem List:  Active Problems:    Pneumonia (12/1/2017)      Sepsis ([address] (12/1/2017)      Elevated bilirubin (12/1/2017)       ____________________________________________________________________  Jorje Darling is a 47 y.o. male presents with Pneumonia  Elevated bilirubin  Sepsis ([address] As noted in H&P: \"45 y.o. PMH stroke, HTN who presents to the ED because SOB, fever, cough. He said this symptoms started at list 2 weeks ago. He thought it was a regular cold so did not see a MD. The symptoms got worse over the past 2 days reason why he now decided to come to the ED for further eval and treatment. He denies travel hx or sick contacts. \"    ______________________________________________________________________    The patient is currently obtunded/can provide no history; HPI as above. ECG:  Sinus, LBBB/LAD; F/u with sinus tachy (not changed otherwise). Tele: sinus  CXR: PNA RLL; Abd CT pending. Notable labs: WBC 18; Cr 1.28 from 1.4 5/2017; Nl CK/trop 0.09. proBNP 12k. Notable prior cardiac history:  None known. Reported h/o HTN, asthma, CVA (?details). LBBB noted on ecg 5/2017; having PERALTA then per report. BP then was poorly controlled due to non-compliance (ran out of meds 5 yrs ago and hadn't been taking since then).       ______________________________________________________________________  Patient Active Problem List    Diagnosis Date Noted    Pneumonia 12/01/2017    S[facility]is ([facility] 12/01/2017    Elevated bilirubin 12/01/2017        Past Medical History:   Diagnosis Date    Asthma     Hypertension     Stroke ([facility]     in 2012      No past surgical history on file. No Known Allergies   No family history on file. Social History     Social History    Marital status: SINGLE     Spouse name: N/A    Number of children: N/A    Years of education: N/A     Occupational History    Not on file. Social History Main Topics    Smoking status: Current Some Day Smoker     Packs/day: 0.25    Smokeless tobacco: Never Used    Alcohol use 2.4 oz/week     4 Cans of beer per week      Comment: occassional    Drug use: No    Sexual activity: Not on file     Other Topics Concern    Not on file     Social History Narrative     Current Facility-Administered Medications   Medication Dose Route Frequency    sodium chloride (NS) flush 5-10 mL  5-10 mL IntraVENous PRN    nitroglycerin (NITROBID) 2 % ointment 1 Inch  1 Inch Topical Q6H PRN    sodium chloride 0.9 % bolus infusion 1,000 mL  1,000 mL IntraVENous NOW    0.9% sodium chloride infusion  130 mL/hr IntraVENous CONTINUOUS    [START ON 12/2/2017] cefTRIAXone (ROCEPHIN) 1 g in 0.9% sodium chloride (MBP/ADV) 50 mL  1 g IntraVENous Q24H    [START ON 12/2/2017] azithromycin (ZITHROMAX) 500 mg in 0.9% sodium chloride (MBP/ADV) 250 mL  500 mg IntraVENous Q24H    [START ON 12/2/2017] lisinopril (PRINIVIL, ZESTRIL) tablet 5 mg  5 mg Oral DAILY    NOREPINephrine (LEVOPHED) 1 mg/mL injection         Current Outpatient Prescriptions   Medication Sig    aspirin (ASPIRIN) 325 mg tablet Take 325 mg by mouth daily. No reported FHx of early CAD or SCD    Prior to Admission Medications:  Prior to Admission medications    Medication Sig Start Date End Date Taking? Authorizing Provider   aspirin (ASPIRIN) 325 mg tablet Take 325 mg by mouth daily.    Yes Phys Other, MD        Review of Symptoms: As noted in H&P:  \"Total of 12 systems reviewed as follows:                                                                      POSITIVE= underlined text  Negative = text not underlined  General:                                         fever, chills, sweats, generalized weakness, weight loss/gain,                                                                     loss of appetite   Eyes:                                                         blurred vision, eye pain, loss of vision, double vision  ENT:                                                          rhinorrhea, pharyngitis   Respiratory:                                   cough, sputum production, SOB, PERALTA, wheezing, pleuritic pain   Cardiology:                                    chest pain, palpitations, orthopnea, PND, edema, syncope   Gastrointestinal:                 abdominal pain , N/V, diarrhea, dysphagia, constipation, bleeding   Genitourinary:                     frequency, urgency, dysuria, hematuria, incontinence   Muskuloskeletal :                arthralgia, myalgia, back pain  Hematology:                                  easy bruising, nose or gum bleeding, lymphadenopathy   Dermatological:                   rash, ulceration, pruritis, color change / jaundice  Endocrine:                                     hot flashes or polydipsia   Neurological:                       headache, dizziness, confusion, focal weakness, paresthesia,                                                                    Speech difficulties, memory loss, gait difficulty  Psychological:                    Feelings of anxiety, depression, agitation\".      24 hr VS reviewed, overall VSSAF  Temp (24hrs), Av.5 °F (36.4 °C), Min:97.5 °F (36.4 °C), Max:97.5 °F (36.4 °C)    Patient Vitals for the past 8 hrs:   Pulse   17 1830 99   17 1815 (!) 103   17 1800 (!) 109   17 1730 (!) 112   17 1700 (!) 108   17 1630 (!) 109   17 1600 99   17 1530 (!) 111   17 1500 (!) 104   12/01/17 1430 (!) 102   12/01/17 1348 (!) 104    Patient Vitals for the past 8 hrs:   Resp   12/01/17 1830 (!) 38   12/01/17 1815 (!) 33   12/01/17 1800 (!) 41   12/01/17 1730 (!) 41   12/01/17 1700 (!) 44   12/01/17 1630 (!) 40   12/01/17 1600 28   12/01/17 1530 (!) 43   12/01/17 1500 29   12/01/17 1430 (!) 34   12/01/17 1348 (!) 40    Patient Vitals for the past 8 hrs:   BP   12/01/17 1830 (!) 131/103   12/01/17 1815 (!) 130/92   12/01/17 1800 (!) 171/117   12/01/17 1730 (!) 180/134   12/01/17 1700 (!) 167/123   12/01/17 1630 (!) 165/110   12/01/17 1600 (!) 146/100   12/01/17 1530 (!) 173/134   12/01/17 1500 (!) 147/118   12/01/17 1430 (!) 153/111   12/01/17 1348 (!) 165/114        No intake or output data in the 24 hours ending 12/01/17 1951      Physical Exam (complete single organ system exam)    Cons: The patient is obtunded. Appears stated age. HEENT: Normal conjunctivae and palate. No xanthelasma. Neck: Flat JVP without appreciable HJR. Resp: Increased respiratory effort with coarse rhonchi throughout. CV: Regular rate and rhythm. PMI not palpated. Normal S1,S2  No gallop or rubs appreciated. No murmur appreciated. Intact carotid upstroke bilaterally without appreciated bruits. Abdominal aorta not palpated; no abdominal bruit noted. Normal femoral pulses without bruits. Intact pedal pulses. No peripheral edema. GI: No abd mass noted, soft; no organomegaly noted. Bowel sounds present. Muscular:  No significant kyphosis. Strength WNL for age. Ext: No cyanosis, clubbing, or stigmata of peripheral embolization. Derm: No ulcers or stasis dermatitis of lower extremities. Neuro: Obtunded; not responding to commands/sternal rub.      Labs:   Recent Results (from the past 24 hour(s))   EKG, 12 LEAD, INITIAL    Collection Time: 12/01/17  1:57 PM   Result Value Ref Range    Ventricular Rate 99 BPM    Atrial Rate 99 BPM    P-R Interval 152 ms    QRS Duration 172 ms    Q-T Interval 450 ms QTC Calculation (Bezet) 577 ms    Calculated P Axis 67 degrees    Calculated R Axis -23 degrees    Calculated T Axis 146 degrees    Diagnosis       Normal sinus rhythm  Biatrial enlargement  Left bundle branch block  When compared with ECG of 16-MAY-2017 16:49,  No significant change was found     CBC WITH AUTOMATED DIFF    Collection Time: 12/01/17  2:26 PM   Result Value Ref Range    WBC 18.3 (H) 4.1 - 11.1 K/uL    RBC 4.79 4.10 - 5.70 M/uL    HGB 12.3 12.1 - 17.0 g/dL    HCT 37.7 36.6 - 50.3 %    MCV 78.7 (L) 80.0 - 99.0 FL    MCH 25.7 (L) 26.0 - 34.0 PG    MCHC 32.6 30.0 - 36.5 g/dL    RDW 14.6 (H) 11.5 - 14.5 %    PLATELET 686 (H) 203 - 400 K/uL    NEUTROPHILS 86 (H) 32 - 75 %    LYMPHOCYTES 6 (L) 12 - 49 %    MONOCYTES 8 5 - 13 %    EOSINOPHILS 0 0 - 7 %    BASOPHILS 0 0 - 1 %    ABS. NEUTROPHILS 15.8 (H) 1.8 - 8.0 K/UL    ABS. LYMPHOCYTES 1.0 0.8 - 3.5 K/UL    ABS. MONOCYTES 1.4 (H) 0.0 - 1.0 K/UL    ABS. EOSINOPHILS 0.0 0.0 - 0.4 K/UL    ABS. BASOPHILS 0.0 0.0 - 0.1 K/UL   METABOLIC PANEL, COMPREHENSIVE    Collection Time: 12/01/17  2:26 PM   Result Value Ref Range    Sodium 135 (L) 136 - 145 mmol/L    Potassium 4.0 3.5 - 5.1 mmol/L    Chloride 104 97 - 108 mmol/L    CO2 21 21 - 32 mmol/L    Anion gap 10 5 - 15 mmol/L    Glucose 145 (H) 65 - 100 mg/dL    BUN 22 (H) 6 - 20 MG/DL    Creatinine 1.28 0.70 - 1.30 MG/DL    BUN/Creatinine ratio 17 12 - 20      GFR est AA >60 >60 ml/min/1.73m2    GFR est non-AA 59 (L) >60 ml/min/1.73m2    Calcium 8.5 8.5 - 10.1 MG/DL    Bilirubin, total 2.1 (H) 0.2 - 1.0 MG/DL    ALT (SGPT) 347 (H) 12 - 78 U/L    AST (SGOT) 213 (H) 15 - 37 U/L    Alk.  phosphatase 131 (H) 45 - 117 U/L    Protein, total 7.1 6.4 - 8.2 g/dL    Albumin 3.1 (L) 3.5 - 5.0 g/dL    Globulin 4.0 2.0 - 4.0 g/dL    A-G Ratio 0.8 (L) 1.1 - 2.2     CK W/ CKMB & INDEX    Collection Time: 12/01/17  2:26 PM   Result Value Ref Range    CK 71 39 - 308 U/L    CK - MB 1.5 <3.6 NG/ML    CK-MB Index 2.1 0 - 2.5 MAGNESIUM    Collection Time: 12/01/17  2:26 PM   Result Value Ref Range    Magnesium 2.1 1.6 - 2.4 mg/dL   NT-PRO BNP    Collection Time: 12/01/17  2:26 PM   Result Value Ref Range    NT pro-BNP 84096 (H) 0 - 125 PG/ML   TROPONIN I    Collection Time: 12/01/17  2:26 PM   Result Value Ref Range    Troponin-I, Qt. 0.09 (H) <0.05 ng/mL   INFLUENZA A & B AG (RAPID TEST)    Collection Time: 12/01/17  3:14 PM   Result Value Ref Range    Influenza A Antigen NEGATIVE  NEG      Influenza B Antigen NEGATIVE  NEG     LACTIC ACID    Collection Time: 12/01/17  4:03 PM   Result Value Ref Range    Lactic acid 2.6 (HH) 0.4 - 2.0 MMOL/L   EKG, 12 LEAD, INITIAL    Collection Time: 12/01/17  4:43 PM   Result Value Ref Range    Ventricular Rate 108 BPM    Atrial Rate 108 BPM    P-R Interval 148 ms    QRS Duration 160 ms    Q-T Interval 410 ms    QTC Calculation (Bezet) 549 ms    Calculated P Axis 68 degrees    Calculated R Axis -26 degrees    Calculated T Axis 136 degrees    Diagnosis       Sinus tachycardia  Biatrial enlargement  Left bundle branch block  Abnormal ECG  When compared with ECG of 01-DEC-2017 13:57,  MANUAL COMPARISON REQUIRED, DATA IS UNCONFIRMED       Recent Labs      12/01/17   1426   CPK  71   CKMB  1.5   CKNDX  2.1   TROIQ  0.09*       Wolf Vinson MD

## 2017-12-02 NOTE — ED NOTES
Pts belongings given to pts sister: brown leather watch, black pleather wallet, iphone, black glasses, gray beanie, gray [employer] hoodie, 2 black [employer] socks, black basketball shorts, black short sleeve [employer] shirt, blue [employer] tennis shoes, dark gray beaded bracelet, 1 clear studded stone earring, and leather strapped watch.

## 2017-12-02 NOTE — ED NOTES
Pt placed back in room, sheet covering pt. Lights dimmed. 3 chairs and tissues provided for family at pts bedside. Curtain closed for privacy. Dr. Delayne Kawasaki talking with family to bring back to pts room.

## 2017-12-02 NOTE — ED NOTES
Spoke with Giacomo Spencer, patient will go to [address] [facility] after blood draw.

## 2017-12-02 NOTE — ED NOTES
TRANSFER - OUT REPORT:    Verbal report given to Ej Hagan RN (name) on Eduarda Blank  being transferred to Med Onc (unit) for routine progression of care       Report consisted of patients Situation, Background, Assessment and   Recommendations(SBAR). Information from the following report(s) SBAR, Kardex and ED Summary was reviewed with the receiving nurse. Lines:   Peripheral IV 12/01/17 Right Forearm (Active)   Site Assessment Clean, dry, & intact 12/1/2017  6:45 PM   Phlebitis Assessment 0 12/1/2017  6:45 PM   Infiltration Assessment 0 12/1/2017  6:45 PM   Dressing Status Clean, dry, & intact 12/1/2017  6:45 PM   Hub Color/Line Status Pink 12/1/2017  6:45 PM   Alcohol Cap Used No 12/1/2017  6:45 PM        Opportunity for questions and clarification was provided.       Patient transported with:   Registered Nurse

## 2017-12-02 NOTE — ED NOTES
PCT had been attempting to send lactic redraw. Dr. Army Lind was evaluating pt, when pt's BP dropped into 91'D systolic. Respirations increased to 40. Pt became obtunded and unresponsive. A minute before pt become unresponsive pt reports becoming hot and asking for a cold rag for forehead. Tech at bedside attempting to obtain another PIV access. Pts skin cool to touch. Unable to retrieve pulse oxygen at this time. CO2 detector hooked to pt at this time.

## 2017-12-02 NOTE — ED NOTES
2105: Code blue called in CT. Compressions initiated. 2106: 1mg Epi given  2107: Bicarb administered  2108: Pulse check. No pulse. 2109: 1mg Epi administered  2111: V-fib. Shock administered. 2113: Pulse check. V-fib. Shock administered. 1mg epi administered. 2114: Dopamine infusion started. 5mcg/kg/min  2114: Pastoral care called. 2116: Pulse check. PEA. Compressions continued. 2116: 1mg Epi administered. 2118: Pulse check. PEA. Compressions resumed. 2119: 50 mEq Bicarb administered. 2120: Pulse check. PEA. Compressions resumed. 2120: Family in CT at bedside. 2120: 1mg Epi administered  2122: Pulse check. PEA. Compressions resumed. 2123: 1mg Epi administered. 2124: Pulse check. PEA. No cardiac activity with US. Compressions resumed. 2124: Spoke with family about plan. Family would like CPR to be continued. 2126: Pulse check. PEA.  2127: 1mg Epi administered. Further documentation on this code recorded in real time.

## 2017-12-02 NOTE — ED NOTES
Disregard Code Narrator:    2008: Pt with no pulse. 2009: atropine administered  2010: epi administered. CPR resumed. 2012: Pulse check - PEA. Another round of epi administered. 2015: Pt with ROSC - sinus tach.

## 2017-12-02 NOTE — PROGRESS NOTES
Spiritual Care Assessment/Progress Notes    Barbi Edwards [id number]  [id number]    1965  47 y.o.  male    Patient Telephone Number: [phone] (home)   Muslim Affiliation: [facility]   Language: English   Extended Emergency Contact Information  Primary Emergency Contact: [address] [facility]  Mobile Phone: [phone]  Relation: Girlfriend   Patient Active Problem List    Diagnosis Date Noted    Pneumonia 12/01/2017    Sepsis ([address] 12/01/2017    Elevated bilirubin 12/01/2017        Date: 12/2/2017       Level of Muslim/Spiritual Activity:  []      Involved in rené tradition/spiritual practice    []      Not involved in rené tradition/spiritual practice    [x]      Spiritually oriented    []      Claims no spiritual orientation    []      Seeking spiritual identity    []      Feels alienated from Presybeterian practice/tradition    []      Feels angry about Presybeterian practice/tradition    []      Spirituality/Presybeterian tradition is not a resource for coping at this time    [x]      Not able to assess due to medical condition        Services Provided Today:  []        crisis intervention []        reading Scriptures   []        spiritual assessment []        prayer   [x]        empathic listening/emotional support []        rites and rituals (cite in comments)   [x]        life review []        Presybeterian support   []        theological development []        advocacy   []        ethical dialog []        blessing   [x]        bereavement support [x]        support to family   []        anticipatory grief support []        help with AMD   []        spiritual guidance []        meditation        Spiritual Care Needs  Spiritual Care Plan   []    Emotional Support []    Follow up visits with pt/family   [x]    Spiritual/Muslim Care []    Provide materials   []    Loss/Adjustment []    Schedule sacraments   []    Advocacy/Referral /Ethics []    Contact Community Clergy   []    No needs expressed at this time []    Follow up as needed   []    Other: (note in comments) []    Other: (note in comments)     Comments: Responded to request from ER nursing staff to support family of patient. Patient did not survive. Support for family members as they grieved over the unexpected loss. Met with patient's sisters, Jed Pair, and several other family members. Offered words of comfort and ministry of presence. Rev.  Chaplain Theron  Paging Service: [phone])

## 2017-12-02 NOTE — ED NOTES
Spoke with Iam Owens at [place] - he notes that pt is not a candidate for tissue or eye donation and that the body can be released to the family at this time.

## 2017-12-02 NOTE — ED NOTES
Pt to be transferred to [address] by ED RN and then will be transported to room upstairs. No telemetry orders for pt to travel to [address] [facility].

## 2017-12-02 NOTE — ED NOTES
Pts pulse decreased to 62 at this time. Nurse asked Ct tech to remove pt from CT scan. No pulse palpated at this time. CPR initiated.

## 2017-12-02 NOTE — ED NOTES
Pt remaining in room until pts mother arrives, pt will then be taken to [facility].

## 2017-12-02 NOTE — ED NOTES
Respiratory called to bedside to initiate intubation. RSI box, vent, glidescope, difficult airway cart, and code cart at bedside. Bagging pt with ambubag by RN at this time.

## 2017-12-02 NOTE — DISCHARGE SUMMARY
Discharge Summary     Patient:  Latoya Chen       MRN: [id number]       YOB: 1965       Age: 47 y.o. Date of admission:  12/1/2017    Date of discharge:  12/1/2017    Primary care provider:  Dr. Maura Gonzalez provider:  Renata Pathak MD    Discharging provider:  Renata Pathak MD - Mobile phone/text messaging: [phone]. If unavailable, call [phone] and ask the  to page the triage hospitalist.     Consultations  · None    Procedures  · Intubation, central line    Discharge destination: [facility]    Admission diagnosis  · Pneumonia  · Elevated bilirubin  · Sepsis [facility])    Current Discharge Medication List          Follow-up Information     None          Presenting complaint and brief hospital course  Please also refer to the admission H&P for details regarding the presenting problem. Patient admitted with severe sepsis secondary to pneumonia. Patient was intubated in the ED. He then coded. He came back but about 30 minutes  later he coded again as he was in the CT scan. He hard CPR for 30 minutes with no response. Patient was pronounce and family was notifyed          Physical examination at discharge  Visit Vitals    BP (!) 41/31    Pulse (!) 104    Temp 97.5 °F (36.4 °C)    Resp 14    Ht 5' 10\" (1.778 m)    Wt 79.4 kg (175 lb 0.7 oz)    SpO2 (!) 85%    BMI 25.12 kg/m2         Pertinent imaging studies      Recent Labs      12/01/17   1426   WBC  18.3*   HGB  12.3   HCT  37.7   PLT  569*     Recent Labs      12/01/17   1426   NA  135*   K  4.0   CL  104   CO2  21   BUN  22*   CREA  1.28   GLU  145*   CA  8.5   MG  2.1     Recent Labs      12/01/17   1426   SGOT  213*   ALT  347*   AP  131*   TBILI  2.1*   TP  7.1   ALB  3.1*   GLOB  4.0     No results for input(s): INR, PTP, APTT in the last 72 hours.     No lab exists for component: INREXT   No results for input(s): FE, TIBC, PSAT, FERR in the last 72 hours. No results for input(s): PH, PCO2, PO2 in the last 72 hours. Recent Labs      12/01/17   1426   CPK  71   CKMB  1.5     No results found for: GLUCPOC    Chronic Diagnoses:    Problem List as of 12/1/2017  Never Reviewed          Codes Class Noted - Resolved    Pneumonia ICD-10-CM: J18.9  ICD-9-CM: 247  12/1/2017 - Present        Sepsis ([facility] ICD-10-CM: A41.9  ICD-9-CM: 038.9, 995.91  12/1/2017 - Present        Elevated bilirubin ICD-10-CM: R17  ICD-9-CM: 277.4  12/1/2017 - Present              Time spent on discharge related activities today greater than 30 minutes.         Signed:  Jennifer Melchor MD                 Hospitalist, Internal Medicine      Cc: None

## 2017-12-02 NOTE — PROGRESS NOTES
TRANSFER - IN REPORT:    Verbal report received from Luz(name) on Jazmine Leone  being received from ED(unit) for routine progression of care      Report consisted of patients Situation, Background, Assessment and   Recommendations(SBAR). Information from the following report(s) SBAR, Kardex, ED Summary, Procedure Summary, Intake/Output, MAR, Accordion, Recent Results and Med Rec Status was reviewed with the receiving nurse. Opportunity for questions and clarification was provided. Assessment completed upon patients arrival to unit and care assumed.            \

## 2017-12-02 NOTE — ED NOTES
Family attempted to walk into pts room, family stopped by nurse. Family asked to sit in conference room to talk with Sara Allen about pts status. Xray at bedside completing portable.

## 2017-12-04 LAB
FLUID CULTURE, SPNG2: NORMAL
L PNEUMO1 AG UR QL IA: NEGATIVE
ORGANISM ID, SPNG3: NORMAL
PLEASE NOTE, SPNG4: NORMAL
S PNEUM AG SPEC QL LA: NEGATIVE
SPECIMEN SOURCE: NORMAL
SPECIMEN SOURCE: NORMAL
SPECIMEN, SPNG1: NORMAL

## 2017-12-07 LAB
BACTERIA SPEC CULT: NORMAL
BACTERIA SPEC CULT: NORMAL
SERVICE CMNT-IMP: NORMAL
SERVICE CMNT-IMP: NORMAL